# Patient Record
Sex: FEMALE | Race: WHITE | NOT HISPANIC OR LATINO | Employment: PART TIME | URBAN - METROPOLITAN AREA
[De-identification: names, ages, dates, MRNs, and addresses within clinical notes are randomized per-mention and may not be internally consistent; named-entity substitution may affect disease eponyms.]

---

## 2017-12-03 ENCOUNTER — OFFICE VISIT (OUTPATIENT)
Dept: URGENT CARE | Facility: URGENT CARE | Age: 22
End: 2017-12-03
Payer: COMMERCIAL

## 2017-12-03 VITALS
HEART RATE: 76 BPM | DIASTOLIC BLOOD PRESSURE: 76 MMHG | TEMPERATURE: 98.4 F | HEIGHT: 61 IN | SYSTOLIC BLOOD PRESSURE: 108 MMHG | OXYGEN SATURATION: 98 % | WEIGHT: 110 LBS | RESPIRATION RATE: 14 BRPM | BODY MASS INDEX: 20.77 KG/M2

## 2017-12-03 DIAGNOSIS — J06.9 VIRAL URI: ICD-10-CM

## 2017-12-03 DIAGNOSIS — R07.0 THROAT PAIN: Primary | ICD-10-CM

## 2017-12-03 LAB
DEPRECATED S PYO AG THROAT QL EIA: NORMAL
FLUAV+FLUBV AG SPEC QL: NEGATIVE
FLUAV+FLUBV AG SPEC QL: NEGATIVE
SPECIMEN SOURCE: NORMAL
SPECIMEN SOURCE: NORMAL

## 2017-12-03 PROCEDURE — 87804 INFLUENZA ASSAY W/OPTIC: CPT | Performed by: FAMILY MEDICINE

## 2017-12-03 PROCEDURE — 87081 CULTURE SCREEN ONLY: CPT | Performed by: FAMILY MEDICINE

## 2017-12-03 PROCEDURE — 99213 OFFICE O/P EST LOW 20 MIN: CPT | Performed by: FAMILY MEDICINE

## 2017-12-03 PROCEDURE — 87880 STREP A ASSAY W/OPTIC: CPT | Performed by: FAMILY MEDICINE

## 2017-12-03 NOTE — PROGRESS NOTES
"SUBJECTIVE:  Kinza Purdy, a 22 year old female scheduled an appointment to discuss the following issues:     Throat pain  Viral URI    Medical, social, surgical, and family histories reviewed.    Urgent Care      Fever  fever, legs ache, feeling sick x 2 days. nausea and post nasal drip. lightheaded. sore throat.       Flu sxs since yesterday.  Myalgia. Facing exams at school.  Sore throat with mild odynophagia.    ROS:  See HPI.  No vomiting.  Low grade fever/chills.  No chest pain/SOB.  No BM/urine problems.  No syncope.      OBJECTIVE:  /76  Pulse 76  Temp 98.4  F (36.9  C) (Oral)  Resp 14  Ht 5' 1\" (1.549 m)  Wt 110 lb (49.9 kg)  LMP 11/26/2017  SpO2 98%  BMI 20.78 kg/m2  EXAM:  GENERAL APPEARANCE: alert and no distress; no cyanosis or accessory muscle use; moist mucus membrane  EYES: Eyes grossly normal to inspection, PERRL and conjunctivae and sclerae normal  HENT: ear canals and TM's normal and nose and mouth without ulcers or lesions; mildly erythematous throat with no exudate  NECK: no adenopathy, no asymmetry, masses, or scars and thyroid normal to palpation  RESP: lungs clear to auscultation - no rales, rhonchi or wheezes  CV: regular rates and rhythm, normal S1 S2, no S3 or S4 and no murmur, click or rub  LYMPHATICS: normal ant/post cervical and supraclavicular nodes  ABDOMEN: soft, nontender, without hepatosplenomegaly or masses and bowel sounds normal  MS: extremities normal- no gross deformities noted  SKIN: no suspicious lesions or rashes  NEURO: Normal strength and tone, mentation intact and speech normal    ASSESSMENT/PLAN:  (R07.0) Throat pain  (primary encounter diagnosis)  Comment: strep and Influenza negative.  Plan: Strep, Rapid Screen, Beta strep group A         culture, Influenza A/B antigen    (J06.9,  B97.89) Viral URI  Plan: Symptomatic Rx only. Fluid, rest.  Pt to f/up PCP if no improvement or worsening.  Warning signs and symptoms explained.        "

## 2017-12-03 NOTE — PATIENT INSTRUCTIONS

## 2017-12-03 NOTE — MR AVS SNAPSHOT
After Visit Summary   12/3/2017    Kinza Purdy    MRN: [id number]           Patient Information     Date Of Birth          1995        Visit Information        Provider Department      12/3/2017 11:30 AM Ben Hannon MD [facility]        Today's Diagnoses     Throat pain    -  1    Viral URI          Care Instructions      Viral Upper Respiratory Illness (Adult)  You have a viral upper respiratory illness (URI), which is another term for the common cold. This illness is contagious during the first few days. It is spread through the air by coughing and sneezing. It may also be spread by direct contact (touching the sick person and then touching your own eyes, nose, or mouth). Frequent handwashing will decrease risk of spread. Most viral illnesses go away within 7 to 10 days with rest and simple home remedies. Sometimes the illness may last for several weeks. Antibiotics will not kill a virus, and they are generally not prescribed for this condition.    Home care    If symptoms are severe, rest at home for the first 2 to 3 days. When you resume activity, don't let yourself get too tired.    Avoid being exposed to cigarette smoke (yours or others ).    You may use acetaminophen or ibuprofen to control pain and fever, unless another medicine was prescribed. (Note: If you have chronic liver or kidney disease, have ever had a stomach ulcer or gastrointestinal bleeding, or are taking blood-thinning medicines, talk with your healthcare provider before using these medicines.) Aspirin should never be given to anyone under 18 years of age who is ill with a viral infection or fever. It may cause severe liver or brain damage.    Your appetite may be poor, so a light diet is fine. Avoid dehydration by drinking 6 to 8 glasses of fluids per day (water, soft drinks, juices, tea, or soup). Extra fluids will help loosen secretions in the nose and lungs.    Over-the-counter  cold medicines will not shorten the length of time you re sick, but they may be helpful for the following symptoms: cough, sore throat, and nasal and sinus congestion. (Note: Do not use decongestants if you have high blood pressure.)  Follow-up care  Follow up with your healthcare provider, or as advised.  When to seek medical advice  Call your healthcare provider right away if any of these occur:    Cough with lots of colored sputum (mucus)    Severe headache; face, neck, or ear pain    Difficulty swallowing due to throat pain    Fever of 100.4 F (38 C)  Call 911, or get immediate medical care  Call emergency services right away if any of these occur:    Chest pain, shortness of breath, wheezing, or difficulty breathing    Coughing up blood    Inability to swallow due to throat pain  Date Last Reviewed: 9/13/2015 2000-2017 [employer]. [address] [place] [address]. All rights reserved. This information is not intended as a substitute for professional medical care. Always follow your healthcare professional's instructions.                Follow-ups after your visit        Who to contact     If you have questions or need follow up information about today's clinic visit or your schedule please contact [facility] directly at [phone].  Normal or non-critical lab and imaging results will be communicated to you by [employer], letter or phone within 4 business days after the clinic has received the results. If you do not hear from us within 7 days, please contact the clinic through [employer] or phone. If you have a critical or abnormal lab result, we will notify you by phone as soon as possible.  Submit refill requests through [employer] or call your pharmacy and they will forward the refill request to us. Please allow 3 business days for your refill to be completed.          Additional Information About Your Visit        [employer]oor Information     [employer] lets you send  "messages to your doctor, view your test results, renew your prescriptions, schedule appointments and more. To sign up, go to [email or web address] . Click on \"Log in\" on the left side of the screen, which will take you to the Welcome page. Then click on \"Sign up Now\" on the right side of the page.     You will be asked to enter the access code listed below, as well as some personal information. Please follow the directions to create your username and password.     Your access code is: [email or web address]  Expires: 3/3/2018  1:07 PM     Your access code will  in 90 days. If you need help or a new code, please call your [place] clinic or [phone].        Care EveryWhere ID     This is your Care EveryWhere ID. This could be used by other organizations to access your [place] medical records  [id number]        Your Vitals Were     Pulse Temperature Respirations Height Last Period Pulse Oximetry    76 98.4  F (36.9  C) (Oral) 14 5' 1\" (1.549 m) 2017 98%    BMI (Body Mass Index)                   20.78 kg/m2            Blood Pressure from Last 3 Encounters:   17 108/76    Weight from Last 3 Encounters:   17 110 lb (49.9 kg)              We Performed the Following     Beta strep group A culture     Influenza A/B antigen     Strep, Rapid Screen        Primary Care Provider Fax #    Physician No Ref-Primary [phone]       No address on file        Equal Access to Services     OLIVER GROSSMAN : Hadii dalila chaseo Somaydaali, waaxda luqadaha, qaybta kaalmada adeegyada, tammie villegas . So [facility] [phone].    ATENCIÓN: Si habla español, tiene a mcfarland disposición servicios gratuitos de asistencia lingüística. Llame al [phone].    We comply with applicable federal civil rights laws and [place] laws. We do not discriminate on the basis of race, color, national origin, age, disability, sex, sexual orientation, or gender identity.            Thank you!     Thank you for " choosing [facility]  for your care. Our goal is always to provide you with excellent care. Hearing back from our patients is one way we can continue to improve our services. Please take a few minutes to complete the written survey that you may receive in the mail after your visit with us. Thank you!             Your Updated Medication List - Protect others around you: Learn how to safely use, store and throw away your medicines at www.disposemymeds.org.      Notice  As of 12/3/2017  1:07 PM    You have not been prescribed any medications.

## 2017-12-04 LAB
BACTERIA SPEC CULT: NORMAL
SPECIMEN SOURCE: NORMAL

## 2020-05-01 ENCOUNTER — TELEPHONE (OUTPATIENT)
Dept: OTHER | Facility: OUTPATIENT CENTER | Age: 25
End: 2020-05-01

## 2020-05-01 NOTE — TELEPHONE ENCOUNTER
Telephone Intake--[facility] Adult  Date: 2020  Client Name:  Kinza  Preferred Name: Christine   MRN: .mrn  : .bday        Age:  .age  Caller Name:  Relationship to Client:      Presenting Problem / Reason for Assessment   (Clinical History &Symptoms):     FTM  Goals: Decrease dysphoria, masculinity, deeper voice, facial / body hair  Family Support: Positive      Clarify their goals/expected services from [facility] medical care--what are they looking for?    Clarify with patient (as necessary) that we are not a primary care clinic and that we do not have a surgeon. We strongly recommend that patients have a primary care doctor for their overall health needs, and we can refer to primary care clinics. We can assist with surgery referrals.  Yes      Length of time experiencing symptoms: Puberty (12-13)      Seen Other Providers for Gender (if so, where):    M.D/other.(hormones) : No  --If yes, request records from the provider at the 1st session.    Therapist:   --if yes, request a referral or summary letter from the therapist at the 1st session..    Psychiatrist: NO  --if yes,, request records from the provider at the 1st session..      Other Medical/Mental Health Diagnoses: Gender Dysphoria,         If needed, clarify if patient calling from group home or other supervised living arrangement    Note if patient has no mental health or cognitive diagnosis, but phone behavior suggests impairment      Medications:  Fluocox, strattera        Primary Care Provider:                     --If multiple medical conditions, request recent records from primary doctor at the 1st session..      Referral Source:        Follow Up:        Please Verify Registration    If patient has [employer] or [employer], send to .     Prep Welcome Packet and have patient come half hour beforehand to fill out forms in packet (health history form, transgender history, Safety Screening, PHQ9, and MARIE-7.

## 2020-11-16 ENCOUNTER — OFFICE VISIT (OUTPATIENT)
Dept: FAMILY MEDICINE | Facility: CLINIC | Age: 25
End: 2020-11-16
Payer: COMMERCIAL

## 2020-11-16 VITALS
WEIGHT: 122 LBS | TEMPERATURE: 98.7 F | HEART RATE: 85 BPM | HEIGHT: 62 IN | SYSTOLIC BLOOD PRESSURE: 141 MMHG | OXYGEN SATURATION: 99 % | BODY MASS INDEX: 22.45 KG/M2 | DIASTOLIC BLOOD PRESSURE: 87 MMHG

## 2020-11-16 DIAGNOSIS — Z78.9 FEMALE-TO-MALE TRANSGENDER PERSON: Primary | ICD-10-CM

## 2020-11-16 PROBLEM — F32.5 DEPRESSION, MAJOR, IN REMISSION (H): Status: ACTIVE | Noted: 2017-04-14

## 2020-11-16 LAB
BASOPHILS # BLD AUTO: 0 10E9/L (ref 0–0.2)
BASOPHILS NFR BLD AUTO: 0.3 %
DIFFERENTIAL METHOD BLD: NORMAL
EOSINOPHIL # BLD AUTO: 0.2 10E9/L (ref 0–0.7)
EOSINOPHIL NFR BLD AUTO: 1.7 %
ERYTHROCYTE [DISTWIDTH] IN BLOOD BY AUTOMATED COUNT: 12.3 % (ref 10–15)
HBA1C MFR BLD: 5.3 % (ref 0–5.6)
HCT VFR BLD AUTO: 42.5 % (ref 35–47)
HGB BLD-MCNC: 14.4 G/DL (ref 11.7–15.7)
LYMPHOCYTES # BLD AUTO: 2.2 10E9/L (ref 0.8–5.3)
LYMPHOCYTES NFR BLD AUTO: 21.5 %
MCH RBC QN AUTO: 29.8 PG (ref 26.5–33)
MCHC RBC AUTO-ENTMCNC: 33.9 G/DL (ref 31.5–36.5)
MCV RBC AUTO: 88 FL (ref 78–100)
MONOCYTES # BLD AUTO: 0.8 10E9/L (ref 0–1.3)
MONOCYTES NFR BLD AUTO: 7.8 %
NEUTROPHILS # BLD AUTO: 7 10E9/L (ref 1.6–8.3)
NEUTROPHILS NFR BLD AUTO: 68.7 %
PLATELET # BLD AUTO: 436 10E9/L (ref 150–450)
RBC # BLD AUTO: 4.84 10E12/L (ref 3.8–5.2)
WBC # BLD AUTO: 10.2 10E9/L (ref 4–11)

## 2020-11-16 PROCEDURE — 80061 LIPID PANEL: CPT | Performed by: FAMILY MEDICINE

## 2020-11-16 PROCEDURE — 80053 COMPREHEN METABOLIC PANEL: CPT | Performed by: FAMILY MEDICINE

## 2020-11-16 PROCEDURE — 83036 HEMOGLOBIN GLYCOSYLATED A1C: CPT | Performed by: FAMILY MEDICINE

## 2020-11-16 PROCEDURE — 85025 COMPLETE CBC W/AUTO DIFF WBC: CPT | Performed by: FAMILY MEDICINE

## 2020-11-16 PROCEDURE — 99214 OFFICE O/P EST MOD 30 MIN: CPT | Performed by: FAMILY MEDICINE

## 2020-11-16 PROCEDURE — 36415 COLL VENOUS BLD VENIPUNCTURE: CPT | Performed by: FAMILY MEDICINE

## 2020-11-16 RX ORDER — ATOMOXETINE 80 MG/1
80 CAPSULE ORAL
COMMUNITY
Start: 2020-07-23

## 2020-11-16 RX ORDER — FLUOXETINE 40 MG/1
40 CAPSULE ORAL
COMMUNITY
Start: 2020-07-23

## 2020-11-16 RX ORDER — TESTOSTERONE GEL, 1% 10 MG/G
50 GEL TRANSDERMAL DAILY
Qty: 30 PACKET | Refills: 0 | Status: SHIPPED | OUTPATIENT
Start: 2020-11-16 | End: 2020-12-21

## 2020-11-16 ASSESSMENT — MIFFLIN-ST. JEOR: SCORE: 1256.64

## 2020-11-16 NOTE — PATIENT INSTRUCTIONS
To Do:    ---A Pap/Cervical Exam    ---Have your therapist send me a brief summary of your care there    We will review labs        Discussed that the following changes are likely and permanent even if  stop taking testosterone:   -?bigger clitoris -- typically about half an inch to a little more than an inch   -?deeper voice   -?gradual growth of moustache and beard   -?hair loss at the temples and crown of the head -- possibility of being completely bald   -?more, thicker, and coarser hairs on abdomen, arms, back, chest, and legs     Discussed that the following changes are usually not permanent -- they are likely to go away if  stop taking testosterone:   -?acne (many permanently scar)   -?menstrual periods typically stop one to six months after starting   -?more abdominal fat - redistributed to a male shape: decreased on buttocks, hips, and thighs; increased in abdomen - changing from ?pear shape? to ?apple shape?   -?more muscle mass and strength   -?more sex drive   -?vaginal dryness     Discussed that the effects of testosterone on fertility are unknown.  Patient can still get pregnant even after testosterone stops menstrual periods.    Discussed that some aspects  will not be changed:   -?Losing some fat may make breasts appear slightly smaller, but they will not shrink very much.   -?Although voice will deepen, other aspects  may not sound manlier.     Discussed risks, including  that the medical effects and the safety of testosterone are not completely known. There may be long-term risks that are not yet known.    testosterone can cause changes that increase my risk of heart disease. I know these changes include having   -?less good cholesterol (HDL) that may protect against heart disease and more bad cholesterol (LDL) that may increase the risk of heart disease   -?higher blood pressure   -?more deposits of fat around my internal organs    testosterone can damage the liver and possibly lead to liver  disease. I know I should be checked for possible liver damage for as long as I take testosterone.    testosterone can increase my red blood cells and hemoglobin. that a higher increase can cause problems that can be life-threatening. These problems include stroke and heart attack. That s why I know I need to have periodic blood checks for as long as I take testosterone.    increase my risk for diabetes.  the body can turn testosterone into estrogen.  no one knows if that could increase the risk of cancers of the breast, the ovaries, or the uterus.    testosterone can thin the tissue of my cervix and the walls of my vagina.  testosterone can cause emotional changes. testosterone causes changes that other people will notice       Masculinizing           Resource for referrals, Ie surgery, skin, hair, voice, etc    PATTIE Castro, SINDHU  Pronouns: he/him/his  Transgender  Care Coordinator   [facility]  [address] [place] [address] [place] [address]  [email or web address]  Appointments: [phone] Office: [phone] Fax: [phone]

## 2020-11-16 NOTE — PROGRESS NOTES
"Subjective     Kinza Rika Purdy is a 25 year old female who presents to clinic today for the following health issues:    HPI         Hormone consult           HPI     Christine is a 25 year old individual that uses pronouns He/Him/His/Himself that presents today for establishment of care for   masculinizing hormone therapy.     Started thinking about gender not matching body in middle school  Has been a bit to anxious about it to really talk much to medical providers, did talk with his PCP  Has a therapist  Family/sister is supportive    Recent visit:On atomoxetine 80 mg daily. The medication is very helpful for keeping her focused and accomplishing tasks. If she doesn't take it, she doesn't get anything done. No significant issues with sleep. Stopped drinking lemonade at work and her intermittent chest pains went away. Appetite is \"fine\".     Is also taking fluoxetine 40 mg daily. Mood is good. Is working on learning to drive. PHQ9 score of 4 (down from 10). The patient has no thoughts of hurting self or others. GAD7 3 (down from 5).     Has been working ([employer] in [place]), playing [employer] online.     Is considering starting testosterone. Since high school she has felt that she identifies as a male.        Any special concerns today?  anxious    On hormones?  No    Gender affirmation is being sought in these other ways: trans/nonbinary community involvement, pronoun support at work/school/home    Apparently he had a phone consulation with planned parent zion but did not get an Rx      :    S.O.G.I.  Patient's Preferred First Name:  Christine  Patient's pronouns:  he/him/his       Patient's sexual orientation:  Don't know  Patient's gender identity:    Patient's sex assigned at birth:  Female    Steps patient has taken to transition, if any:                      Patient's future plans to transition, if any:       Organs the patient currently has:     breasts  cervix  ovaries  uterus  vagina     "     Organs present at birth or expected at birth to develop:     breasts  cervix  ovaries  uterus  vagina         Organs hormonally enhanced or developed:          Organs surgically enhanced or constructed:            ---    No past surgical history on file.    Patient Active Problem List   Diagnosis     ADHD (attention deficit hyperactivity disorder), combined type     Depression, major, in remission (H)     Female-to-male transgender person     Autism spectrum disorder       Current Outpatient Medications   Medication Sig Dispense Refill     atomoxetine (STRATTERA) 80 MG capsule Take 80 mg by mouth       FLUoxetine (PROZAC) 40 MG capsule Take 40 mg by mouth       testosterone (ANDROGEL/TESTIM) 50 MG/5GM (1%) topical gel Place 1 packet (50 mg of testosterone) onto the skin daily Apply to the clean, dry intact skin of the shoulders, upper arms or abdomen. 30 packet 0       History   Smoking Status     Never Smoker   Smokeless Tobacco     Never Used     Family History   Problem Relation Age of Onset     Depression Mother      Hypertension Father      Diabetes Maternal Grandmother         No Known Allergies    Problem, Medication and Allergy Lists were reviewed and are current..         Review of Systems:        General    Fat redistribution: no    Weight change: no HEENT    Voice change: no                Skin    Acne or oily skin: no       Genitourinary ()    Abnormal vaginal bleeding: no  Musculoskeletal    Leg pain or swelling: no     Psychiatric (Psych)    Depression: no    Anxiety/Panic: YES    Mood:  nervous        10 point ROS of systems including Constitutional, Eyes, Respiratory, Cardiovascular, Gastroenterology, Genitourinary, Integumentary, Muscularskeletal, Psychiatric were all negative except for pertinent positives noted in my HPI.              Physical Exam:     Vitals:    11/16/20 1243   BP: (!) 141/87   BP Location: Left arm   Cuff Size: Adult Regular   Pulse: 85   Temp: 98.7  F (37.1  C)  "  TempSrc: Tympanic   SpO2: 99%   Weight: 55.3 kg (122 lb)   Height: 1.575 m (5' 2\")     BMI= Body mass index is 22.31 kg/m .   Wt Readings from Last 10 Encounters:   11/16/20 55.3 kg (122 lb)   12/03/17 49.9 kg (110 lb)     Appearance: Female appearance and dress    GENERAL:: healthy, alert and no distress  General Appearance: Pleasant, alert, in no acute respiratory distress.  Head Exam: Normal.   Eye Exam: Normal external eye,  lids. EVARISTO.  Ear Exam: Normal   external ears.  Chest/Respiratory Exam: Normal, comfortable, easy respirations   Cardiovascular Exam: normal color, circulation   Musculoskeletal Exam: full ROM of upper and lower extremities.  Skin: no rashes  Gyn: normal secondary sexual characteristic for birth sex  Neurologic Exam: Nonfocal, no tremor. Normal gait.    GROOMING: Adequately groomed.  ATTIRE: Appropriate.  AGE: Appears stated.  BEHAVIOR TOWARDS EXAMINER: Cooperative.  MOTOR ACTIVITY: figity  EYE CONTACT: furtive  Mood:  anxious  Affect:  mood congruent  SPEECH/LANGUAGE: Unremarkable.  ATTENTION: Unremarkable.  CONCENTRATION: Unremarkable.  THOUGHT PROCESS: Unremarkable  THOUGHT CONTENT: Unremarkable for hallucinations and delusions.  ORIENTATION: Times 3.  MEMORY: No evidence of impairment.  JUDGMENT: No evidence of impairment.  ESTIMATED INTELLIGENCE: above Average.  DEMONSTRATED INSIGHT: excellent  FUND OF KNOWLEDGE: excellent             Labs:      Results from the last 24 hours  Results for orders placed or performed in visit on 11/16/20 (from the past 24 hour(s))   CBC with platelets and differential   Result Value Ref Range    WBC 10.2 4.0 - 11.0 10e9/L    RBC Count 4.84 3.8 - 5.2 10e12/L    Hemoglobin 14.4 11.7 - 15.7 g/dL    Hematocrit 42.5 35.0 - 47.0 %    MCV 88 78 - 100 fl    MCH 29.8 26.5 - 33.0 pg    MCHC 33.9 31.5 - 36.5 g/dL    RDW 12.3 10.0 - 15.0 %    Platelet Count 436 150 - 450 10e9/L    % Neutrophils 68.7 %    % Lymphocytes 21.5 %    % Monocytes 7.8 %    % Eosinophils " 1.7 %    % Basophils 0.3 %    Absolute Neutrophil 7.0 1.6 - 8.3 10e9/L    Absolute Lymphocytes 2.2 0.8 - 5.3 10e9/L    Absolute Monocytes 0.8 0.0 - 1.3 10e9/L    Absolute Eosinophils 0.2 0.0 - 0.7 10e9/L    Absolute Basophils 0.0 0.0 - 0.2 10e9/L    Diff Method Automated Method    Hemoglobin A1c   Result Value Ref Range    Hemoglobin A1C 5.3 0 - 5.6 %       Assessment and Plan     1. Female-to-male transgender person  - CBC with platelets and differential  - Comprehensive metabolic panel  - Lipid Profile (Chol, Trig, HDL, LDL calc)  - Hemoglobin A1c  - testosterone (ANDROGEL/TESTIM) 50 MG/5GM (1%) topical gel; Place 1 packet (50 mg of testosterone) onto the skin daily Apply to the clean, dry intact skin of the shoulders, upper arms or abdomen.  Dispense: 30 packet; Refill: 0        Contraception:   abstinence.     Counselled patient about controlled substances: Yes.     Follow up:  I will call to discuss  Results by mychart  Questions were elicited and answered.     Discussed thoroughly risks/benefits/alternatives of this treatment. Questions elicited and answered. Pt is fully prepared to start hormones and is able to reason through risks and mgmt.      gender dysphoria diagnosis, its viewability on MyChart, prescriptions, insurance forms and potential release to parents if on parents insurance.      lack of insurance coverage for many of these meds, however we can try to obtain through a PA and possibly an appeal.       fertility impact (possibly permanent and irreversible) yet also cannot serve as contraception and sexual activity that can result in pregnancy requires contraception. Discussed this could be sperm-having females (transwomen) in his future.}     CV, circulatory, skin, abdominal, social and sexual side effects, potential treatments and resources.      Hormone start plan:   masculinizing hormone therapy with testosterone   Consent signed - see scanned.    Administration method:  transdermal, topical,  injectable  Next labs and exam:   . Next dose increase likely in 1 month if labs and exam are normal.    ---------------------------------------------------------------------------------------------------------  Today s visit included assessment of interventions to alleviate symptoms related to gender dysphoria or gender nonconformity, including     psychological support    medical treatment    options for social support or changes in gender expression.   Hormone assessment concludes that the patient:   Yes Is able to provide informed consent   Yes Likely to take hormones in a responsible manner  Yes Discussed physical effects, benefits, and risk assessment & modification  Yes Discussed the clinical and biochemical monitoring of hormonal changes and the potential impact on reproductive health & fertility    This assessment is based on the 2011 published Standards of Care for the Health of Transsexual, Transgender, and Gender-Nonconforming People, Version 7, by the [employer] Professional [employer] of Transgender Health.  Today s visit assessed this individuals:    Suicide risk. Transgender patients are at higher risk of suicide.    gender expression    gender identity and how well consolidated in that identity     presence or absence of gender dysphoria versus gender non-conformity    assessment and diagnosis of coexisting mental health concerns    Gianni Mistry MD

## 2020-11-16 NOTE — NURSING NOTE
"Chief Complaint   Patient presents with     Consult     hormone care     initial BP (!) 141/87 (BP Location: Left arm, Cuff Size: Adult Regular)   Pulse 85   Temp 98.7  F (37.1  C) (Tympanic)   Ht 1.575 m (5' 2\")   Wt 55.3 kg (122 lb)   SpO2 99%   BMI 22.31 kg/m   Estimated body mass index is 22.31 kg/m  as calculated from the following:    Height as of this encounter: 1.575 m (5' 2\").    Weight as of this encounter: 55.3 kg (122 lb).  BP completed using cuff size: regular.  L  arm      Health Maintenance that is potentially due pending provider review:  NONE    n/a    Kenneth Leonard ma  "

## 2020-11-17 LAB
ALBUMIN SERPL-MCNC: 4.3 G/DL (ref 3.4–5)
ALP SERPL-CCNC: 83 U/L (ref 40–150)
ALT SERPL W P-5'-P-CCNC: 21 U/L (ref 0–50)
ANION GAP SERPL CALCULATED.3IONS-SCNC: 8 MMOL/L (ref 3–14)
AST SERPL W P-5'-P-CCNC: 17 U/L (ref 0–45)
BILIRUB SERPL-MCNC: 0.3 MG/DL (ref 0.2–1.3)
BUN SERPL-MCNC: 10 MG/DL (ref 7–30)
CALCIUM SERPL-MCNC: 9 MG/DL (ref 8.5–10.1)
CHLORIDE SERPL-SCNC: 105 MMOL/L (ref 94–109)
CHOLEST SERPL-MCNC: 136 MG/DL
CO2 SERPL-SCNC: 23 MMOL/L (ref 20–32)
CREAT SERPL-MCNC: 0.51 MG/DL (ref 0.52–1.04)
GFR SERPL CREATININE-BSD FRML MDRD: >90 ML/MIN/{1.73_M2}
GLUCOSE SERPL-MCNC: 85 MG/DL (ref 70–99)
HDLC SERPL-MCNC: 50 MG/DL
LDLC SERPL CALC-MCNC: 73 MG/DL
NONHDLC SERPL-MCNC: 86 MG/DL
POTASSIUM SERPL-SCNC: 3.5 MMOL/L (ref 3.4–5.3)
PROT SERPL-MCNC: 8 G/DL (ref 6.8–8.8)
SODIUM SERPL-SCNC: 136 MMOL/L (ref 133–144)
TRIGL SERPL-MCNC: 64 MG/DL

## 2020-11-19 ENCOUNTER — TELEPHONE (OUTPATIENT)
Dept: FAMILY MEDICINE | Facility: CLINIC | Age: 25
End: 2020-11-19

## 2020-11-19 NOTE — TELEPHONE ENCOUNTER
PA for testosterone (ANDROGEL/TESTIM) 50 MG/5GM (1%) topical gel initiated through [employer].  ID# [id number]  [employer] ph# [phone]

## 2020-11-24 ENCOUNTER — MYC MEDICAL ADVICE (OUTPATIENT)
Dept: FAMILY MEDICINE | Facility: CLINIC | Age: 25
End: 2020-11-24

## 2020-12-09 NOTE — TELEPHONE ENCOUNTER
Summary:    Patient is due/failing the following:   PAP    Type of outreach:  Sent [employer] message.  Action needed: Patient needs office visit for pap.    If need for provider review:    Please indicate OV, lab, MTM, or nurse appt if needed.  Indicate fasting or not fasting.                                                                                                                                          Dede FULTON

## 2020-12-17 ENCOUNTER — MYC MEDICAL ADVICE (OUTPATIENT)
Dept: FAMILY MEDICINE | Facility: CLINIC | Age: 25
End: 2020-12-17

## 2020-12-17 DIAGNOSIS — Z78.9 FEMALE-TO-MALE TRANSGENDER PERSON: ICD-10-CM

## 2020-12-18 NOTE — TELEPHONE ENCOUNTER
AFIA CERVANTES also approved.  Please advise on [employer].  Thanks,  Sunshine Regalado RN      
JF,  Please see below [employer] message and advise.  PA initiated back in Nov for Androgel - routing to Mabel as well to see if PA approved/denied/etc  Thanks,  Julia CAROLINA RN    
PA approved as of 11/22/2020.  
Oriented - self; Oriented - place; Oriented - time

## 2020-12-28 ENCOUNTER — TELEPHONE (OUTPATIENT)
Dept: FAMILY MEDICINE | Facility: CLINIC | Age: 25
End: 2020-12-28

## 2020-12-28 DIAGNOSIS — Z78.9 FEMALE-TO-MALE TRANSGENDER PERSON: ICD-10-CM

## 2020-12-28 NOTE — TELEPHONE ENCOUNTER
JF,  Please see below and reorder alt below if appropriate.  Pharm pended.  Thanks,  Sunshine Regalado RN

## 2020-12-29 RX ORDER — TESTOSTERONE GEL, 1% 10 MG/G
50 GEL TRANSDERMAL DAILY
Qty: 30 PACKET | Refills: 3 | Status: SHIPPED | OUTPATIENT
Start: 2020-12-29 | End: 2021-01-28

## 2020-12-30 NOTE — TELEPHONE ENCOUNTER
PA approved for Testim.  Genaro unable to get a paid claim.  LM on VM for pt to get NDC# for covered product as he is an employee of [employer] [facility] [employer] and their reps are unable to speak with us.  [employer]um[employer] [phone]  ID# [id number]  Genaro ph# [phone].

## 2020-12-31 ENCOUNTER — TELEPHONE (OUTPATIENT)
Dept: FAMILY MEDICINE | Facility: CLINIC | Age: 25
End: 2020-12-31

## 2020-12-31 NOTE — TELEPHONE ENCOUNTER
Patient Quality Outreach      Summary:      Patient is due/failing the following:   Cervical Cancer Screening - PAP Needed    Type of outreach:    Phone, spoke to patient/parent. Christine has a pap scheduled next week outside of [place].    Questions for provider review:    None                                                                                                                                     Mireya Burleson     Chart routed to Care Team.

## 2020-12-31 NOTE — LETTER
[facility]  [address]  [place] [facility] [address] [phone]       February 23, 2021    Kinza Meléndez Héctor Purdy  [address]  [place] [address]    Dear Christine,    We care about your health and have reviewed your health plan and are making recommendations based on this review, to optimize your health.    You are in particular need of attention regarding:  -Wellness (Physical) Visit     We are recommending that you:    We do offer early morning visits where there is minimal patient traffic here.  -schedule a PAP SMEAR EXAM which is due.  Please disregard this reminder if you have had this exam elsewhere within the last year.  It would be helpful for us to have a copy of your recent pap smear report in our file so that we can best coordinate your care.    If you are under/uninsured, we recommend you contact the [facility]. They offer pap smears at no charge or on a sliding fee charge. You can schedule with them at [phone]. Please have them send us the results.      In addition, here is a list of due or overdue Health Maintenance reminders.    Health Maintenance Due   Topic Date Due     Preventive Care Visit  1995     Chlamydia Screening  1996     Discuss Advance Care Planning  1996     Depression Action Plan  1996     Depression Assessment  1996     HIV Screening  08/01/2011     Hepatitis C Screening  08/01/2014     PAP Smear  08/01/2017       To address the above recommendations, we encourage you to contact us at [phone], via Smark[employer] or by contacting Central Scheduling toll free at [phone] 24 hours a day. They will assist you with finding the most convenient time and location.    Thank you for trusting [facility] and we appreciate the opportunity to serve you.  We look forward to supporting your healthcare needs in the future.    Healthy Regards,    Your [facility] Team

## 2021-01-15 ENCOUNTER — HEALTH MAINTENANCE LETTER (OUTPATIENT)
Age: 26
End: 2021-01-15

## 2021-01-28 ENCOUNTER — VIRTUAL VISIT (OUTPATIENT)
Dept: FAMILY MEDICINE | Facility: CLINIC | Age: 26
End: 2021-01-28
Payer: COMMERCIAL

## 2021-01-28 DIAGNOSIS — Z78.9 FEMALE-TO-MALE TRANSGENDER PERSON: Primary | ICD-10-CM

## 2021-01-28 DIAGNOSIS — N94.19 FUNCTIONAL DYSPAREUNIA: ICD-10-CM

## 2021-01-28 PROCEDURE — 99214 OFFICE O/P EST MOD 30 MIN: CPT | Mod: 95 | Performed by: FAMILY MEDICINE

## 2021-01-28 RX ORDER — TESTOSTERONE GEL, 1% 10 MG/G
50 GEL TRANSDERMAL DAILY
Qty: 30 PACKET | Refills: 3 | Status: SHIPPED | OUTPATIENT
Start: 2021-01-28 | End: 2021-04-29

## 2021-01-28 SDOH — HEALTH STABILITY: MENTAL HEALTH: HOW OFTEN DO YOU HAVE A DRINK CONTAINING ALCOHOL?: NEVER

## 2021-01-28 NOTE — PROGRESS NOTES
Christine is a 25 year old who is being evaluated via a billable video visit.      How would you like to obtain your AVS? MyChart  If the video visit is dropped, the invitation should be resent by: Text to cell phone: [phone]  Will anyone else be joining your video visit? No    Video Start Time: 12:13 PM  Assessment & Plan     Female-to-male transgender person    ---------------------------------------------------------------------------------------------------------  Today s visit included assessment of interventions to alleviate symptoms related to gender dysphoria or gender nonconformity, including     psychological support    medical treatment    options for social support or changes in gender expression.   Hormone assessment concludes that the patient:   Yes Is able to provide informed consent   Yes Likely to take hormones in a responsible manner  Yes Discussed physical effects, benefits, and risk assessment & modification  Yes Discussed the clinical and biochemical monitoring of hormonal changes and the potential impact on reproductive health & fertility    This assessment is based on the 2011 published Standards of Care for the Health of Transsexual, Transgender, and Gender-Nonconforming People, Version 7, by the World Professional Association of Transgender Health.  Today s visit assessed this individuals:    Suicide risk. Transgender patients are at higher risk of suicide.    gender expression    gender identity and how well consolidated in that identity     presence or absence of gender dysphoria versus gender non-conformity    assessment and diagnosis of coexisting mental health concerns    Unclear why there are these problems with pharmacy    We will try sendning to our pharmacy, discussed with clinical pharmacist    - testosterone (ANDROGEL/TESTIM) 50 MG/5GM (1%) topical gel; Place 1 packet (50 mg of testosterone) onto the skin daily Apply to the clean, dry intact skin of the shoulders, upper arms or  abdomen.       Functional dyspareunia  No Hx of intercourse, low risk for HPV  We will just hold off on pap screening for now    30 minutes spent on the date of the encounter doing chart review, review of outside records, review of test results, patient visit and documentation     Gianni Mistry MD  [facility]    Shyam King is a 25 year old who presents to clinic today for the following health issues    HPI     Patient states she's had a hard time getting medications  Having issues getting Testosterone gel - states she goes to pharmacy and they turn here away saying insurance won't cover it   We have report that testime was approved from [place], not sure what the problem is    We will try sending to a [place] pharmacy      Pap question -  Tried getting pap done with ativan at another doc, this was not successful    I think we will just hold off for now    Health Maintenance Addressed:  PHQ9 - didn't want to answer out loud - sent to [facility]  Pap - wants to discuss with PCP      Review of Systems   Constitutional, HEENT, cardiovascular, pulmonary, GI, , musculoskeletal, neuro, skin, endocrine and psych systems are negative, except as otherwise noted.      Objective         Vitals:  No vitals were obtained today due to virtual visit.    Physical Exam   GENERAL: Healthy, alert and no distress  EYES: Eyes grossly normal to inspection.  No discharge or erythema, or obvious scleral/conjunctival abnormalities.  RESP: No audible wheeze, cough, or visible cyanosis.  No visible retractions or increased work of breathing.    SKIN: Visible skin clear. No significant rash, abnormal pigmentation or lesions.  NEURO: Cranial nerves grossly intact.  Mentation and speech appropriate for age.  PSYCH: Mentation appears normal, affect normal/bright, judgement and insight intact, normal speech and appearance well-groomed.          Video-Visit Details    Type of service:  Video Visit    Video  End Time:12:26 PM    Originating Location (pt. Location): Home    Distant Location (provider location):  [facility]     Platform used for Video Visit: Bob

## 2021-02-23 NOTE — TELEPHONE ENCOUNTER
Patient Quality Outreach 2nd Attempt      Summary:    Type of outreach:    Sent letter.    Next Steps:  Reach out within 90 days via Letter.    Max number of attempts reached: No. Will try again in 90 days if patient still on fail list.    Questions for provider review:    None                                                                                                                    Odalis Crespo MA  Medical Assistant  [facility]

## 2021-04-28 NOTE — PROGRESS NOTES
"    Assessment & Plan     Female-to-male transgender person    Noticing changes at a normal pace    - CBC with platelets and differential  - Testosterone, total  - Comprehensive metabolic panel  - testosterone (ANDROGEL/TESTIM) 50 MG/5GM (1%) topical gel; Place 1 packet (50 mg of testosterone) onto the skin daily Apply to the clean, dry intact skin of the shoulders, upper arms or abdomen.    We are deferring cervical cancer screening.  Pt is low risk anyway, has not been sexually active      Return in about 3 months (around 7/29/2021) for Medication recheck.    Gianni Mistry MD  [facility]    Shyam King is a 25 year old who presents for the following health issues     HPI     Medication Followup of testosterone gel     Taking Medication as prescribed: yes    Side Effects:  None    Medication Helping Symptoms:  yes      3 months on T now  Noticing ssome skin and hair and \"embarrassing\" changes.  Previous provider mentions increased libido    Background:    Started thinking about gender not matching body in middle school  Had been a bit to anxious about it to really talk much to medical providers, did talk with his PCP  Has a therapist  Family/sister is supportive    Recent visit:On atomoxetine 80 mg daily. The medication is very helpful for keeping her focused and accomplishing tasks. If she doesn't take it, she doesn't get anything done. No significant issues with sleep. Stopped drinking lemonade at work and her intermittent chest pains went away. Appetite is \"fine\".     Is also taking fluoxetine 40 mg daily. Mood is good. Is working on learning to drive. PHQ9 score of 4 (down from 10). The patient has no thoughts of hurting self or others. GAD7 3 (down from 5).     Has been working ([employer] [place]), playing [employer] and Dragons online.      Since high school he has felt that he  identifies as a male.  Any special concerns today?  anxious    On hormones?  yes    Gender " "affirmation is being sought in these other ways: trans/nonbinary community involvement, pronoun support at work/school/home    Apparently he had a phone consulation with planned parent donovan but did not get an Rx      S.O.G.I.  Patient's Preferred First Name:  Christine  Patient's pronouns:  he/him/his       Patient's sexual orientation:  Don't know  Patient's gender identity:  Male  Patient's sex assigned at birth:  Female    Steps patient has taken to transition, if any:     Presentation aligned with gender identity                Patient's future plans to transition, if any:       Organs the patient currently has:     breasts  cervix  ovaries  uterus  vagina         Organs present at birth or expected at birth to develop:     breasts  cervix  ovaries  uterus  vagina         Organs hormonally enhanced or developed:          Organs surgically enhanced or constructed:                Review of Systems   Constitutional, HEENT, cardiovascular, pulmonary, GI, , musculoskeletal, neuro, skin, endocrine and psych systems are negative, except as otherwise noted.      Objective    BP (!) 137/90 (Patient Position: Sitting, Cuff Size: Adult Regular)   Pulse 92   Temp 97  F (36.1  C) (Tympanic)   Resp 20   Ht 1.575 m (5' 2\")   Wt 57.4 kg (126 lb 8 oz)   SpO2 100%   BMI 23.14 kg/m    Body mass index is 23.14 kg/m .  Physical Exam   GENERAL: healthy, alert and no distress  PSYCH: mentation appears normal and anxious                "

## 2021-04-29 ENCOUNTER — OFFICE VISIT (OUTPATIENT)
Dept: FAMILY MEDICINE | Facility: CLINIC | Age: 26
End: 2021-04-29
Payer: COMMERCIAL

## 2021-04-29 VITALS
TEMPERATURE: 97 F | HEIGHT: 62 IN | RESPIRATION RATE: 20 BRPM | DIASTOLIC BLOOD PRESSURE: 90 MMHG | SYSTOLIC BLOOD PRESSURE: 137 MMHG | HEART RATE: 92 BPM | BODY MASS INDEX: 23.28 KG/M2 | WEIGHT: 126.5 LBS | OXYGEN SATURATION: 100 %

## 2021-04-29 DIAGNOSIS — Z78.9 FEMALE-TO-MALE TRANSGENDER PERSON: ICD-10-CM

## 2021-04-29 LAB
ALBUMIN SERPL-MCNC: 3.9 G/DL (ref 3.4–5)
ALP SERPL-CCNC: 78 U/L (ref 40–150)
ALT SERPL W P-5'-P-CCNC: 20 U/L (ref 0–50)
ANION GAP SERPL CALCULATED.3IONS-SCNC: 11 MMOL/L (ref 3–14)
AST SERPL W P-5'-P-CCNC: 12 U/L (ref 0–45)
BASOPHILS # BLD AUTO: 0 10E9/L (ref 0–0.2)
BASOPHILS NFR BLD AUTO: 0.3 %
BILIRUB SERPL-MCNC: 0.3 MG/DL (ref 0.2–1.3)
BUN SERPL-MCNC: 10 MG/DL (ref 7–30)
CALCIUM SERPL-MCNC: 8.9 MG/DL (ref 8.5–10.1)
CHLORIDE SERPL-SCNC: 105 MMOL/L (ref 94–109)
CO2 SERPL-SCNC: 24 MMOL/L (ref 20–32)
CREAT SERPL-MCNC: 0.51 MG/DL (ref 0.52–1.04)
DIFFERENTIAL METHOD BLD: NORMAL
EOSINOPHIL # BLD AUTO: 0.3 10E9/L (ref 0–0.7)
EOSINOPHIL NFR BLD AUTO: 3.7 %
ERYTHROCYTE [DISTWIDTH] IN BLOOD BY AUTOMATED COUNT: 12.2 % (ref 10–15)
GFR SERPL CREATININE-BSD FRML MDRD: >90 ML/MIN/{1.73_M2}
GLUCOSE SERPL-MCNC: 67 MG/DL (ref 70–99)
HCT VFR BLD AUTO: 41.6 % (ref 35–47)
HGB BLD-MCNC: 13.2 G/DL (ref 11.7–15.7)
LYMPHOCYTES # BLD AUTO: 1.7 10E9/L (ref 0.8–5.3)
LYMPHOCYTES NFR BLD AUTO: 24.1 %
MCH RBC QN AUTO: 27.8 PG (ref 26.5–33)
MCHC RBC AUTO-ENTMCNC: 31.7 G/DL (ref 31.5–36.5)
MCV RBC AUTO: 88 FL (ref 78–100)
MONOCYTES # BLD AUTO: 0.8 10E9/L (ref 0–1.3)
MONOCYTES NFR BLD AUTO: 11.3 %
NEUTROPHILS # BLD AUTO: 4.2 10E9/L (ref 1.6–8.3)
NEUTROPHILS NFR BLD AUTO: 60.6 %
PLATELET # BLD AUTO: 410 10E9/L (ref 150–450)
POTASSIUM SERPL-SCNC: 3.8 MMOL/L (ref 3.4–5.3)
PROT SERPL-MCNC: 7 G/DL (ref 6.8–8.8)
RBC # BLD AUTO: 4.75 10E12/L (ref 3.8–5.2)
SODIUM SERPL-SCNC: 140 MMOL/L (ref 133–144)
WBC # BLD AUTO: 7 10E9/L (ref 4–11)

## 2021-04-29 PROCEDURE — 80053 COMPREHEN METABOLIC PANEL: CPT | Performed by: FAMILY MEDICINE

## 2021-04-29 PROCEDURE — 36415 COLL VENOUS BLD VENIPUNCTURE: CPT | Performed by: FAMILY MEDICINE

## 2021-04-29 PROCEDURE — 84403 ASSAY OF TOTAL TESTOSTERONE: CPT | Mod: 90 | Performed by: FAMILY MEDICINE

## 2021-04-29 PROCEDURE — 99000 SPECIMEN HANDLING OFFICE-LAB: CPT | Performed by: FAMILY MEDICINE

## 2021-04-29 PROCEDURE — 99214 OFFICE O/P EST MOD 30 MIN: CPT | Performed by: FAMILY MEDICINE

## 2021-04-29 PROCEDURE — 85025 COMPLETE CBC W/AUTO DIFF WBC: CPT | Performed by: FAMILY MEDICINE

## 2021-04-29 RX ORDER — TESTOSTERONE GEL, 1% 10 MG/G
50 GEL TRANSDERMAL DAILY
Qty: 30 PACKET | Refills: 3 | Status: SHIPPED | OUTPATIENT
Start: 2021-04-29 | End: 2021-04-30

## 2021-04-29 ASSESSMENT — MIFFLIN-ST. JEOR: SCORE: 1277.05

## 2021-04-29 ASSESSMENT — PATIENT HEALTH QUESTIONNAIRE - PHQ9: SUM OF ALL RESPONSES TO PHQ QUESTIONS 1-9: 5

## 2021-05-03 LAB — TESTOST SERPL-MCNC: 551 NG/DL (ref 8–60)

## 2021-06-01 ENCOUNTER — TELEPHONE (OUTPATIENT)
Dept: FAMILY MEDICINE | Facility: CLINIC | Age: 26
End: 2021-06-01

## 2021-06-01 NOTE — TELEPHONE ENCOUNTER
Central Prior Authorization Team   Phone: [phone]      PA Initiation    Medication: Testim-Initiated  Insurance Company: [employer] ([facility]) - Phone [phone] Fax [phone]  Pharmacy Filling the Rx: [facility] [place], [place] - [address] [address] [place] [address] N, [address]  Filling Pharmacy Phone: [phone]  Filling Pharmacy Fax:    Start Date: 6/1/2021

## 2021-06-01 NOTE — TELEPHONE ENCOUNTER
Prior Authorization Approval    Authorization Effective Date: 6/1/2021  Authorization Expiration Date: 6/1/2022  Medication: Testim-APPROVED  Approved Dose/Quantity:   Reference #:     Insurance Company: [employer] ([facility]) - Phone [phone] Fax [phone]  Expected CoPay:       CoPay Card Available:      Foundation Assistance Needed:    Which Pharmacy is filling the prescription (Not needed for infusion/clinic administered): [facility] - [place], [place] - [address], [address]  Pharmacy Notified: Yes  Patient Notified: No    Pharmacy will notify patient when medication is ready.

## 2021-06-01 NOTE — TELEPHONE ENCOUNTER
**Please Do Not Close This Encounter**               ** Until This Has Been Addressed**          PRIOR AUTHORIZATION REQUIED PER INSURANCE       PATIENT:Gurwinder Anaya YOB: 1995          MEDICATION:Testim 50mg/5gm (1%) gel                    SIG:Place 1 packet (50 mg of testosterone) onto the skin daily Apply to the clean, dry intact skin of the shoulders, upper arms or abdomen.,        NDC:[address]-23      INSURANCE:[employer]       INSURANCE PHONE #:[phone]      ID #:[id number]      INSURANCE REJECT:Enter ICD-10 code provided by prescriber, Non-covered ICD-10 requires AFIA OREILLY Required call [phone], Drug Requires Prior Authorization      PHARMACY:[facility]      PHARMACY NPI:[id number]      PHARMACY PHONE #:[phone]                                                          Please let us know when Prior Auth approved/denied, prescriber refusal to complete prior auth or if you would like to change medication/discontinue                                                            Thank you

## 2021-08-31 ENCOUNTER — MYC MEDICAL ADVICE (OUTPATIENT)
Dept: FAMILY MEDICINE | Facility: CLINIC | Age: 26
End: 2021-08-31

## 2021-09-14 ENCOUNTER — OFFICE VISIT (OUTPATIENT)
Dept: FAMILY MEDICINE | Facility: CLINIC | Age: 26
End: 2021-09-14
Payer: COMMERCIAL

## 2021-09-14 VITALS
WEIGHT: 128 LBS | BODY MASS INDEX: 23.55 KG/M2 | HEIGHT: 62 IN | DIASTOLIC BLOOD PRESSURE: 94 MMHG | HEART RATE: 98 BPM | RESPIRATION RATE: 20 BRPM | TEMPERATURE: 97.5 F | SYSTOLIC BLOOD PRESSURE: 148 MMHG | OXYGEN SATURATION: 100 %

## 2021-09-14 DIAGNOSIS — Z78.9 FEMALE-TO-MALE TRANSGENDER PERSON: Primary | ICD-10-CM

## 2021-09-14 DIAGNOSIS — F90.2 ADHD (ATTENTION DEFICIT HYPERACTIVITY DISORDER), COMBINED TYPE: ICD-10-CM

## 2021-09-14 DIAGNOSIS — Z23 NEED FOR PROPHYLACTIC VACCINATION AND INOCULATION AGAINST INFLUENZA: ICD-10-CM

## 2021-09-14 DIAGNOSIS — F32.5 DEPRESSION, MAJOR, IN REMISSION (H): ICD-10-CM

## 2021-09-14 PROCEDURE — 90471 IMMUNIZATION ADMIN: CPT | Performed by: FAMILY MEDICINE

## 2021-09-14 PROCEDURE — 90686 IIV4 VACC NO PRSV 0.5 ML IM: CPT | Performed by: FAMILY MEDICINE

## 2021-09-14 PROCEDURE — 99213 OFFICE O/P EST LOW 20 MIN: CPT | Mod: 25 | Performed by: FAMILY MEDICINE

## 2021-09-14 ASSESSMENT — MIFFLIN-ST. JEOR: SCORE: 1278.85

## 2021-09-14 NOTE — NURSING NOTE
Prior to immunization administration, verified patients identity using patient s name and date of birth. Please see Immunization Activity for additional information.     Screening Questionnaire for Adult Immunization    Are you sick today?   No   Do you have allergies to medications, food, a vaccine component or latex?   No   Have you ever had a serious reaction after receiving a vaccination?   No   Do you have a long-term health problem with heart, lung, kidney, or metabolic disease (e.g., diabetes), asthma, a blood disorder, no spleen, complement component deficiency, a cochlear implant, or a spinal fluid leak?  Are you on long-term aspirin therapy?   No   Do you have cancer, leukemia, HIV/AIDS, or any other immune system problem?   No   Do you have a parent, brother, or sister with an immune system problem?   No   In the past 3 months, have you taken medications that affect  your immune system, such as prednisone, other steroids, or anticancer drugs; drugs for the treatment of rheumatoid arthritis, Crohn s disease, or psoriasis; or have you had radiation treatments?   No   Have you had a seizure, or a brain or other nervous system problem?   No   During the past year, have you received a transfusion of blood or blood    products, or been given immune (gamma) globulin or antiviral drug?   No   For women: Are you pregnant or is there a chance you could become       pregnant during the next month?   No   Have you received any vaccinations in the past 4 weeks?   No     Immunization questionnaire answers were all negative.        Per orders of Dr. Mistry, injection of Fluzone given by Romy Barrientos MA. Patient instructed to remain in clinic for 15 minutes afterwards, and to report any adverse reaction to me immediately.       Screening performed by Romy Barrientos MA on 9/14/2021 at 3:50 PM.  Romy Barrientos MA on 9/14/2021 at 3:50 PM

## 2021-09-14 NOTE — LETTER
2021      Kinza Purdy  [address]  [place] [address]      I,Gianni Mistry MD,[place] # [id number], [place] issue,VICK [id number], NPI [id number], am the physician of Kinza Purdy AKALEXSANDRA Christine Purdy,  1995,with whom I have a doctor/patient relationship and whom I have treated for gender dysphoria.    Kinza Purdy has had appropriate clinical treament for gender transition to the new gender of male.  My patient began masculinization hormone therapy prescribed by me, in late .  It is a documented fact that after 6 months of masculinization hormone therapy, irreversible physical changes are solidified.  Kinza Purdy has experienced both onset and ongoing changes representing permanent physical change.     I declare under penalty of perjury under the laws of the [place] that the foregoing is true and correct.    Physician's Signature       Gianni Mistry MD  [facility]  [facility]  [address]  [place] [facility] [address]  [phone]  [phone]  2021

## 2021-09-14 NOTE — PROGRESS NOTES
"        Shyam King is a 26 year old who presents for the following health issues     HPI     Medication Followup of testim topical gel    Taking Medication as prescribed: yes    Side Effects:  None    Medication Helping Symptoms:  yes       >6 months on T now  Noticing ssome skin and hair changes.      Background:    Started thinking about gender not matching body in middle school  Had been a bit to anxious about it to really talk much to medical providers, did talk with his PCP  Has a therapist  Family/sister is supportive    Recent visit:On atomoxetine 80 mg daily. The medication is very helpful for keeping her focused and accomplishing tasks. If she doesn't take it, she doesn't get anything done. No significant issues with sleep. Stopped drinking lemonade at work and her intermittent chest pains went away. Appetite is \"fine\".     Is also taking fluoxetine 40 mg daily. Mood is good. Is working on learning to drive. PHQ9 score of 4 (down from 10). The patient has no thoughts of hurting self or others. GAD7 3 (down from 5).     Has been working ([email or web address] in [place]), playing [employer] and Dragons online.      Since high school he has felt that he  identifies as a male.  Any special concerns today?  anxious    On hormones?  yes    Gender affirmation is being sought in these other ways: trans/nonbinary community involvement, pronoun support at work/school/home    Apparently he had a phone consulation with planned parent donovan but did not get an Rx      S.O.G.I.  Patient's Preferred First Name:  Christine  Patient's pronouns:  he/him/his       Patient's sexual orientation:  Don't know  Patient's gender identity:  Male  Patient's sex assigned at birth:  Female    Steps patient has taken to transition, if any:     Presentation aligned with gender identity                Patient's future plans to transition, if any:       Organs the patient currently has:     breasts  cervix  ovaries  uterus  vagina         Organs " "present at birth or expected at birth to develop:     breasts  cervix  ovaries  uterus  vagina         Organs hormonally enhanced or developed:          Organs surgically enhanced or constructed:                Review of Systems   Constitutional, HEENT, cardiovascular, pulmonary, GI, , musculoskeletal, neuro, skin, endocrine and psych systems are negative, except as otherwise noted.      Objective    BP (!) 148/94 (Patient Position: Sitting, Cuff Size: Adult Regular)   Pulse 98   Temp 97.5  F (36.4  C) (Temporal)   Resp 20   Ht 1.575 m (5' 2\")   Wt 58.1 kg (128 lb)   SpO2 100%   BMI 23.41 kg/m    Body mass index is 23.41 kg/m .  Physical Exam   GENERAL: healthy, alert and no distress  PSYCH: mentation appears normal and anxious      Assessment & Plan     Female-to-male transgender person    Noticing changes at a normal pace  Some acne, managable    We are deferring cervical cancer screening.  Pt is low risk anyway, has not been sexually active    Depression, major, in remission (H)    ADHD (attention deficit hyperactivity disorder), combined type    Also discussed that we will assume care for these above issues when he comes to the end of his next medication cycle        Gianni Mistry MD  [facility]            "

## 2022-01-06 DIAGNOSIS — Z78.9 FEMALE-TO-MALE TRANSGENDER PERSON: ICD-10-CM

## 2022-01-07 RX ORDER — TESTOSTERONE 50 MG/5G
GEL TRANSDERMAL
Qty: 30 PACKET | Refills: 1 | Status: SHIPPED | OUTPATIENT
Start: 2022-01-07 | End: 2022-09-14

## 2022-01-07 NOTE — TELEPHONE ENCOUNTER
JF,    Routing refill request to provider for review/approval because:  Androgen Agents Failed 01/06/2022 03:19 PM   Protocol Details  Lipid panel on file in past 12 mos    Refills for this classification require provider review    Blood pressure under 140/90 in past 6 months    Patient is of age 12 and older     Last OV 9/14/21.  Due for follow up in March 2022.    Thanks,  Marisela Brennan RN

## 2022-02-13 ENCOUNTER — HEALTH MAINTENANCE LETTER (OUTPATIENT)
Age: 27
End: 2022-02-13

## 2022-07-13 ENCOUNTER — MEDICAL CORRESPONDENCE (OUTPATIENT)
Dept: HEALTH INFORMATION MANAGEMENT | Facility: CLINIC | Age: 27
End: 2022-07-13

## 2022-07-18 ENCOUNTER — TELEPHONE (OUTPATIENT)
Dept: PSYCHOLOGY | Facility: CLINIC | Age: 27
End: 2022-07-18

## 2022-07-18 NOTE — TELEPHONE ENCOUNTER
Date: 2022    Client Name:  Kinza Purdy  Preferred Name: Christine  MRN: [id number]   : 1995  Age:  26 year old    Presenting Problem / Reason for Assessment:     Letter of Support for top surgery    Suggested Program:  TG    Interested in Couples/Family Therapy?  No    Any legal charges pending?:   No    Patient wishes to be contacted regarding Insurance benefits?:  Yes    Insurance Benefits to be evaluated.  Note will be entered when validated.    Please Verify Registration    Please send Welcome Packet and document date sent.

## 2022-08-08 ENCOUNTER — MEDICAL CORRESPONDENCE (OUTPATIENT)
Dept: HEALTH INFORMATION MANAGEMENT | Facility: CLINIC | Age: 27
End: 2022-08-08

## 2022-08-15 ENCOUNTER — VIRTUAL VISIT (OUTPATIENT)
Dept: PSYCHOLOGY | Facility: CLINIC | Age: 27
End: 2022-08-15
Payer: COMMERCIAL

## 2022-08-15 DIAGNOSIS — F84.0 AUTISM SPECTRUM DISORDER: ICD-10-CM

## 2022-08-15 DIAGNOSIS — F64.0 GENDER DYSPHORIA IN ADOLESCENT AND ADULT: Primary | ICD-10-CM

## 2022-08-15 DIAGNOSIS — F90.2 ADHD (ATTENTION DEFICIT HYPERACTIVITY DISORDER), COMBINED TYPE: ICD-10-CM

## 2022-08-15 PROCEDURE — 90791 PSYCH DIAGNOSTIC EVALUATION: CPT | Mod: GT | Performed by: MARRIAGE & FAMILY THERAPIST

## 2022-08-15 NOTE — PROGRESS NOTES
[facility] - Progress Note    [facility]  Program in Human Sexuality  Department of Family Medicine & Community Health  [facility] [employer]   [address]               [place] [address]  Phone: [phone]  Fax: [phone]  [email or web address]    Transgender Diagnostic (TG) Diagnostic Assessment Interview      Date of Service: 8/15/22  Client Name: Kinza Purdy  YOB: 1995  27 year old  MRN:  [id number]  Gender/Gender Identity: Male  Treating Provider: Edith Almonte PsyD, LMFT  Program: TG  Type of Session: Assessment  Present in Session: Client   Number of Minutes:  55                 Video start time: 2:00  Video end time:2:55    Telemedicine Visit: The patient's condition can be safely assessed and treated via synchronous audio and visual telemedicine encounter.      Reason for Telemedicine Visit: Services only offered telehealth    Originating Site (Patient Location): Patient's home    Distant Site (Provider Location): Provider Remote Setting- Home Office    Consent:  The patient/guardian has verbally consented to: the potential risks and benefits of telemedicine (video visit) versus in person care; bill my insurance or make self-payment for services provided; and responsibility for payment of non-covered services.     Mode of Communication:  Video Conference via [employer]    As the provider I attest to compliance with applicable laws and regulations related to telemedicine.             Ethnicity:    Any relevant cultural/Jew issues? None of note  Referral Source Physician    Chief Complaint/Presenting Problem and Goals:  Client is a birth assigned female who identifies as a transgender male. Client is seeking a letter of support for top surgery at this time due to persistent and longstanding gender  "dysphoria.  ________________________________________________________________    Transgender Health Services  Program-Specific Information    History of gender dysphoria   When was the first time you felt your gender did not fit your sex assigned at birth? Client reported that this was not super conscious, but hit puberty and hated it. Client reported daydreaming about being able to pass as male. By the end of high school used they/them pronouns at school/friends. Client reported that his friends were great when he came out. Client reported trying to explore gender through clothing in high school. Client reported that he really hated getting breasts, and didn't want them, complained about them often.     Body Image/Anatomical dysphoria:  How do you feel about your body? Client reported that he is kind of \"whatever\" about the rest of his body. Client reported that he gets self-conscious/frustrated about his height.    What would you like to see changed? Client is pursuing top surgery at this time.     Client has been on testosterone for over a year (02/2021). Client is pursuing legal name change.     Does dysphoria impact your ability to be sexual? No. Client is aromatic/asexual.    Social Supports:   Who are your social supports? Client see's mom fairly regularly, has good friend/roommate, and good friend that he see's in person. Client has a strong online community (discord/anime/racquel)    ________________________________________________________________________    Mental Health History: Client has been diagnosed with persistent depressive disorder since childhood. Client has been on antidepressants since high school and it is well controlled. Client has tried therapy in the past several times, but does not think its helpful. Client has ADHD that is treated with Strattera. Client was diagnosed with Autism.      Client reported suicidal ideation, attempts most recently in 2018 following bad living " "situation/relationship.     Verbally administer [place] - Suicide Severity Rating Scale (C-SSRS). Use \"Screenings\" tab (Epic left side bar)    Substance Use: No substance use of note.  Medical History: Client reported migraines sporadically.     Relationships/Social History    Family History : Client grew up with mom, dad and younger sister in [place], [place]. Client reported that his mom \"tried but not very hard\" and dad didn't try/didn't acknowledge clients gender. Client reported that mom uses correct name/pronouns. Client reported that he gets along with his parents fine, client reported that relationship with sister is very focused on her/her needs. Client described upbringing as \"fine.\" Client reported that sister and dad fought a lot.     Current Significant Relationship/Partner: Client denied.    Educational History BA from 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    _________________________________________________________________________     CONCLUSIONS    Mental Status   Appearance:  Casually dressed, Well groomed and Appears younger than stated age  Behavior/relationship to examiner/demeanor:  Cooperative  Orientation: Oriented to person, place, time and situation  Speech Rate:  Normal  Speech Spontaneity:  Normal  Mood:  neutral  Affect:  Anxious/Nervous  Thought Process (Associations):  Logical and Linear  Thought Content:  no evidence of suicidal or homicidal ideation  Abnormal Perception:  None  Attention/Concentration:  Fair  Language:  Intact  Insight:  Adequate  Judgment:  Good      DSM-5 Diagnosis:    Encounter Diagnoses   Name Primary?     Gender dysphoria in adolescent and adult Yes     ADHD (attention deficit hyperactivity disorder), combined type      Autism spectrum disorder        Interactive Complexity  Communication difficulties present during current the psychiatric procedure " included:  1. N/A  Conclusions/Recommendations/Initial Treatment Goals:     The client is a 27 year old Not  or  person assigned female at birth who identifies as male. Based on the client's current report of symptoms, client meets criteria for gender dysphoria. The client's mental health concerns affect client's ability to function socially and have been causing clinically significant distress. The client reports no drug/alcohol use. Client denies safety concerns. Based on the client's reported symptoms and impact on functioning, the plan for the patient is:  1. Start supportive individual/family therapy with a provider specialized in gender health.  2. Continue to assess the impact of client's family and relational patterns on their current well-being.   3. Coordinate care as needed with medical, psychological, and family providers specifically gender care.    Signature/Title      Edith Almonte PsyD, KOREYFT

## 2022-08-15 NOTE — LETTER
August 15, 2022     Dr. Eric Ross  [address], [place]   [address]    Re: Christine Purdy   : 1995   Primary letter of support for gender confirmation surgery    Dear Dr. Ross:      It is my pleasure to write this letter of referral for my patient, Christine, who has received services through the [facility] since. Christine is a 27 year old transgender male who initially sought services to explore his gender identity and make decisions about medical interventions to address his gender dysphoria. His most recent appointment for individual psychotherapy occurred on 8/15/22.  I feel qualified to write this letter of referral in my role as a licensed marriage and family therapist. I am a gender specialist with 6 years of experience and feel confident in my diagnostic assessment and referral of this patient for medical intervention services.      Christine completed an individual diagnostic session. He completed a thorough psychological evaluation consisting of completion of a set of evaluative measures of gender dysphoria, psychosocial adjustment, sexual health, and overall psychological functioning. Christine has been diagnosed with gender dysphoria. He has exhibited a persistent and long standing gender and anatomic dysphoria related to his birth assigned sex since puberty. His socially transitioned to live in full-time in his affirmed gender since . Christine started masculinizing hormones in 2021. Despite these changes, Christine continues to experience anatomical dysphoria, primarily chest dysphoria.     Additionally, Christine has been diagnosed with persistent depressive disorder, and has been prescribed Prozac for the treatment of this with positive results. Christine also has a diagnosis of ADHD (uses Strattera) and Autism Spectrum Disorder, but neither pose a barrier at this time. Christine's mental health concerns are stable  and managed, and do not pose any barrier to his ability to consent and undergo gender affirming surgery at this time.      Christine currently meets the World Professional Association for Transgender Health (WPATH) Standards of Care for surgical gender affirmation for gender dysphoric persons. Surgery and affiliated medical interventions to reduce gender dysphoria are considered medically necessary treatments by the WPATH Standards of Care for transgender persons. I support his decision to pursue medically necessary surgery at this time and attest to his ability to make an informed consensual decision about his care. He are over the age of consent in his state of residence. Gender affirmation surgery will decrease his gender dysphoria and psychological distress and improve his overall psychological functioning.  In some cases, denial of access to this surgery can lead to increased isolation, decreased work performance, and decreased sexual and interpersonal functioning. Surgery is expected to increase comfort with self, and improve interpersonal, sexual, and vocational functioning.     Christine has educated themself about the surgery and is aware of its risks and benefits. Given his concerns and the availability of this type of surgery, I support his choice of surgeon. We have thoroughly discussed his expectations and the surgical process including pre-operative planning and preparation. We have identified an aftercare and support plan, which includes continuation of supportive psychotherapy throughout recovery. He is well adjusted and thoughtful, and have approached the decision making for surgery with intention and care.     This letter of referral is based on the psychological indication for surgery and did not include a physical examination to assess medical contra-indications for the surgical procedure. This letter of referral is valid for one year from the date of approval.       If you have any questions, or are  in need of additional information, please do not hesitate to contact me.       Sincerely,             Edith Almonte PsyD, MEGHAN    Program in Human Sexuality/Center for Sexual Health  Department of Family Medicine and Community Health  [facility]

## 2022-08-26 ENCOUNTER — MYC MEDICAL ADVICE (OUTPATIENT)
Dept: PSYCHOLOGY | Facility: CLINIC | Age: 27
End: 2022-08-26

## 2022-09-13 ENCOUNTER — TELEPHONE (OUTPATIENT)
Dept: FAMILY MEDICINE | Facility: CLINIC | Age: 27
End: 2022-09-13

## 2022-09-13 DIAGNOSIS — Z78.9 FEMALE-TO-MALE TRANSGENDER PERSON: ICD-10-CM

## 2022-09-13 PROBLEM — F64.0 GENDER DYSPHORIA IN ADULT: Status: ACTIVE | Noted: 2020-07-23

## 2022-09-13 NOTE — TELEPHONE ENCOUNTER
Prior Authorization Retail Medication Request    Medication/Dose: testim 50mg 1%gel, qty 150  ICD code (if different than what is on RX):   Patient Active Problem List   Diagnosis Code     Gender dysphoria in adult F64.0       Previously Tried and Failed:  Na.  Cannot to injectable     Rationale: Stabel on this med    Insurance Name:  [facility]   Insurance ID:  [id number]      Pharmacy Information (if different than what is on RX)

## 2022-09-14 RX ORDER — TESTOSTERONE 1.62 MG/G
2 GEL TRANSDERMAL DAILY
Qty: 75 G | Refills: 5 | Status: SHIPPED | OUTPATIENT
Start: 2022-09-14

## 2022-09-14 NOTE — TELEPHONE ENCOUNTER
Central Prior Authorization Team   Phone: [phone]      PA Initiation    Medication: Testim 50mg /5gm 1%  Insurance Company: [employer] - Phone [phone] Fax [phone]  Pharmacy Filling the Rx: [facility] [place], [place] - [address] [address]  Filling Pharmacy Phone: [phone]  Filling Pharmacy Fax:    Start Date: 9/14/2022

## 2022-09-14 NOTE — TELEPHONE ENCOUNTER
Called pharmacy.  Carmina from pharmacy states Androgel wasn't covered.  New insurance thinks there is a current prescription from his previous PCP.  Carmina from pharmacy will have patient call insurance to resolve issue with insurance.    RN instructed pharmacy to call back if there any further concerns.     Shekhar Flores RN

## 2022-09-14 NOTE — TELEPHONE ENCOUNTER
Received information from insurance, can the patient be switched to a preferred product?  If so, please send new RX to the pharmacy.  If not, please provide medical justification as to why the formulary medications would not be as effective and route back to me.

## 2022-09-23 ENCOUNTER — TELEPHONE (OUTPATIENT)
Dept: PSYCHOLOGY | Facility: CLINIC | Age: 27
End: 2022-09-23

## 2022-10-16 ENCOUNTER — HEALTH MAINTENANCE LETTER (OUTPATIENT)
Age: 27
End: 2022-10-16

## 2022-12-21 ENCOUNTER — VIRTUAL VISIT (OUTPATIENT)
Dept: PSYCHOLOGY | Facility: CLINIC | Age: 27
End: 2022-12-21
Payer: COMMERCIAL

## 2022-12-21 DIAGNOSIS — Z53.9 ERRONEOUS ENCOUNTER--DISREGARD: Primary | ICD-10-CM

## 2022-12-22 ENCOUNTER — TELEPHONE (OUTPATIENT)
Dept: PSYCHOLOGY | Facility: CLINIC | Age: 27
End: 2022-12-22

## 2023-01-09 NOTE — TELEPHONE ENCOUNTER
Please send patient MyChart msg to remind her to come in for PAP please informed pt that we have early apps starting at 7AM where their is minimal patient traffic.    dietary tray ordered     Svetlana Bryan RN  01/09/23 4840

## 2023-03-26 ENCOUNTER — HEALTH MAINTENANCE LETTER (OUTPATIENT)
Age: 28
End: 2023-03-26

## 2023-03-30 DIAGNOSIS — Z78.9 FEMALE-TO-MALE TRANSGENDER PERSON: ICD-10-CM

## 2023-04-03 RX ORDER — TESTOSTERONE 16.2 MG/G
GEL TRANSDERMAL
Qty: 75 G | Refills: 5 | OUTPATIENT
Start: 2023-04-03

## 2023-04-03 NOTE — TELEPHONE ENCOUNTER
HI I am no longer in the clinic as of 12/16/22, Urgent Care only, please forward to [place] or  provider pool

## 2023-04-04 NOTE — TELEPHONE ENCOUNTER
Called pt - pt confirms does not need us to bridge medication, he has found a new provider and will have that doctor refill meds.      Closing encounter

## 2023-07-10 ENCOUNTER — HOSPITAL ENCOUNTER (EMERGENCY)
Facility: CLINIC | Age: 28
Discharge: HOME | End: 2023-07-10
Attending: FAMILY MEDICINE | Admitting: FAMILY MEDICINE
Payer: COMMERCIAL

## 2023-07-10 VITALS
RESPIRATION RATE: 18 BRPM | OXYGEN SATURATION: 99 % | DIASTOLIC BLOOD PRESSURE: 90 MMHG | BODY MASS INDEX: 26.34 KG/M2 | SYSTOLIC BLOOD PRESSURE: 149 MMHG | WEIGHT: 139.5 LBS | TEMPERATURE: 98.6 F | HEART RATE: 101 BPM | HEIGHT: 61 IN

## 2023-07-10 DIAGNOSIS — F41.9 ANXIETY: ICD-10-CM

## 2023-07-10 DIAGNOSIS — H81.399 PERIPHERAL VERTIGO, UNSPECIFIED LATERALITY: ICD-10-CM

## 2023-07-10 PROCEDURE — 99284 EMERGENCY DEPT VISIT MOD MDM: CPT | Performed by: FAMILY MEDICINE

## 2023-07-10 PROCEDURE — 99283 EMERGENCY DEPT VISIT LOW MDM: CPT

## 2023-07-10 PROCEDURE — 250N000013 HC RX MED GY IP 250 OP 250 PS 637: Performed by: FAMILY MEDICINE

## 2023-07-10 RX ORDER — HYDROXYZINE HYDROCHLORIDE 25 MG/1
25-50 TABLET, FILM COATED ORAL EVERY 6 HOURS PRN
Qty: 20 TABLET | Refills: 1 | Status: SHIPPED | OUTPATIENT
Start: 2023-07-10

## 2023-07-10 RX ORDER — HYDROXYZINE HYDROCHLORIDE 25 MG/1
25 TABLET, FILM COATED ORAL ONCE
Status: COMPLETED | OUTPATIENT
Start: 2023-07-10 | End: 2023-07-10

## 2023-07-10 RX ORDER — LORATADINE 10 MG/1
10 TABLET ORAL DAILY
COMMUNITY

## 2023-07-10 RX ADMIN — HYDROXYZINE HYDROCHLORIDE 25 MG: 25 TABLET, FILM COATED ORAL at 14:22

## 2023-07-10 NOTE — DISCHARGE INSTRUCTIONS
Thank you for choosing [facility].     Please closely monitor for further symptoms. Return to the Emergency Department if you develop any new or worsening signs or symptoms.    If you received any opiate pain medications or sedatives during your visit, please do not drive for at least 8 hours.     Labs, cultures or final xray interpretations may still need to be reviewed.  We will call you if your plan of care needs to be changed.    Please follow up with your primary care physician or clinic.

## 2023-07-10 NOTE — ED PROVIDER NOTES
"ED Provider Note  [facility]      History     Chief Complaint   Patient presents with     Dizziness     States has had vertigo in past.  Swaying feeling.  Goes by [employer]/ He /Him.       HPI  Kinza Purdy is a 27 year old adult who presents to the ER for \"vertigo\".  Patient states on Friday they were at work as a , and had several brief episodes in which they would move or change position and would feel a \"swaying feeling\" as if the ground was moving under their feet.  They did not feel lightheaded or faint and there was no actual spinning.  This occurred multiple times throughout the day.  They have experienced similar symptoms in the past when dehydrated and have been previously diagnosed with peripheral vertigo.  They have recently had some congestion of the sinuses nose and a mild sore throat.  They have some mild frontal pressure no other headache.  No fever or chills, no tinnitus, no hearing loss.  No ear pain.  Denies visual change, speech disturbance, difficulty swallowing, numbness, weakness, tingling, and has not fallen or staggered and walking.  Episodes have persisted, they are very brief and they believe are related to movements occurring multiple times a day all weekend still prominent today but less prominent.  In addition the patient states they are having frequent panic attacks since having COVID last February.  This is manifest as anxiety, palpitations, sweating and feeling extremely anxious.  They are not taking medications but have generally been able to get rid of these by mental redirection.  They are interested in some treatment for anxiety.  Denying any thoughts of harm to self or others and have not discussed this with her primary physician yet.    Past Medical History  Past Medical History:   Diagnosis Date     Allergy      History reviewed. No pertinent surgical history.  atomoxetine (STRATTERA) 80 MG capsule  FLUoxetine (PROZAC) 40 MG " "capsule  hydrOXYzine (ATARAX) 25 MG tablet  loratadine (CLARITIN) 10 MG tablet  testosterone (ANDROGEL 1.62 % PUMP) 20.25 MG/ACT gel      Allergies   Allergen Reactions     Cat Hair Extract Itching     Family History  Family History   Problem Relation Age of Onset     Depression Mother      Hypertension Father      Diabetes Maternal Grandmother      Social History   Social History     Tobacco Use     Smoking status: Never     Smokeless tobacco: Never   Vaping Use     Vaping Use: Never used   Substance Use Topics     Alcohol use: Yes     Comment: rare     Drug use: Never         A medically appropriate review of systems was performed with pertinent positives and negatives noted in the HPI, and all other systems negative.    Physical Exam   BP: (!) 153/96  Pulse: 102  Temp: 98.6  F (37  C)  Resp: 16  Height: 154.9 cm (5' 1\")  Weight: 63.3 kg (139 lb 8 oz)  SpO2: 99 %  Physical Exam  Vitals and nursing note reviewed.   Constitutional:       General: He is not in acute distress.     Appearance: Normal appearance. He is not diaphoretic.   HENT:      Head: Atraumatic.      Mouth/Throat:      Mouth: Mucous membranes are moist.   Eyes:      General: No scleral icterus.     Extraocular Movements:      Right eye: Nystagmus present.      Left eye: Nystagmus present.      Conjunctiva/sclera: Conjunctivae normal.      Comments: Patient does have several beats of horizontal nystagmus which is most prominent when gazing to the right and appears to be rapid component towards the right in all fields of gaze   Cardiovascular:      Rate and Rhythm: Normal rate.      Heart sounds: Normal heart sounds.   Pulmonary:      Effort: No respiratory distress.      Breath sounds: Normal breath sounds.   Abdominal:      General: Abdomen is flat.   Musculoskeletal:      Cervical back: Neck supple.   Skin:     General: Skin is warm.      Findings: No rash.   Neurological:      General: No focal deficit present.      Mental Status: He is alert and " oriented to person, place, and time.      Cranial Nerves: Cranial nerves 2-12 are intact.      Sensory: Sensation is intact.      Motor: Motor function is intact.      Coordination: Coordination is intact. Romberg sign negative. Coordination normal. Finger-Nose-Finger Test and Heel to Shin Test normal. Rapid alternating movements normal.      Gait: Gait is intact.   Psychiatric:         Attention and Perception: Attention normal.         Mood and Affect: Mood is anxious.         Speech: Speech normal.         Behavior: Behavior normal.         Thought Content: Thought content normal.         Cognition and Memory: Cognition normal.         Judgment: Judgment normal.           ED Course, Procedures, & Data      Procedures                      No results found for any visits on 07/10/23.  Medications   hydrOXYzine (ATARAX) tablet 25 mg (has no administration in time range)     Labs Ordered and Resulted from Time of ED Arrival to Time of ED Departure - No data to display  No orders to display          Critical care was not performed.     Medical Decision Making  The patient's presentation was of moderate complexity (an acute illness with systemic symptoms).    The patient's evaluation involved:  review of external note(s) from 2 sources (Viewed clinic notes from ED visit at [facility] for chest pain and outpatient psychology notes)    The patient's management necessitated moderate risk (prescription drug management including medications given in the ED).      Assessment & Plan    27-year-old patient with a history of major depression, ADHD, anxiety, transgender male to male, presenting with multiple brief episodes of acute self-limited vertigo.  Differential diagnosis includes but not limited to:  Central causes such as vertebro-basilar artery insufficiency, cerebellar hemorrhage or infarct, infection such as meningitis, CNS neoplasm, MS.  Also peripheral causes considered including benign positional vertigo, labyrinthitis,  vestibular neuronitis, otitis media, Ménière's disease, traumatic injury to the labyrinthine apparatus.  On exam patient is hypertensive, however this is not significantly changed when compared to multiple prior encounters.  Other vitals unremarkable.  Alert oriented x3 with normal mental status is somewhat anxious.  There is cerumen in both external auditory canals, no tenderness or drainage no mastoid tenderness, hearing normal.  There is horizontal nystagmus to the right as documented above.  Mental status normal, neck supple.  Cardiovascular exam and neurologic exam completely normal.  Though the patient does have a history of hypertension, I do not find any other red flags for central vertigo.  There is no significant associated headache, the neck is supple, no objective ataxia on exam, Romberg test negative.  They have no nausea or vomiting, no dysmetria, no dysdiaokokinesis.  No visual disturbance subjectively or objectively, no dysarthria or dysphagia, and no motor weakness or objective sensory deficit.  They were offered meclizine but declined, they are hydrating normally by mouth.  Do not see indication for labs or diagnostic studies clinically based on the entire presentation.  She did request medication for anxiety and was given hydroxyzine.  I feel the patient is stable to be treated symptomatically for what is likely a self-limited episode of peripheral vertigo related to recent nonspecific viral URI.  Given a prescription for hydroxyzine to be used as needed for anxiety encouraged him strongly to follow-up with her primary care physician.  We discussed the indications for emergency department return and follow-up.  Stable for discharge.      I have reviewed the nursing notes. I have reviewed the findings, diagnosis, plan and need for follow up with the patient.    New Prescriptions    HYDROXYZINE (ATARAX) 25 MG TABLET    Take 1-2 tablets (25-50 mg) by mouth every 6 hours as needed for anxiety        Final diagnoses:   Peripheral vertigo, unspecified laterality   Anxiety       Ronnie Griffin MD  [facility] EMERGENCY DEPARTMENT  7/10/2023     Ronnie Griffin MD  07/10/23 2528

## 2023-07-10 NOTE — ED NOTES
Pt discharged home at this time, ambulatory. Pt provided w/ printed and verbal discharge instructions at this time. Pt provided w/ printed and verbal discharge instructions at this time. Pt verbalized understanding of instructions and medication usage, questions answered at this time. Pt stable at time of discharge.

## 2024-02-19 ENCOUNTER — HOSPITAL ENCOUNTER (EMERGENCY)
Facility: CLINIC | Age: 29
Discharge: HOME | End: 2024-02-20
Attending: EMERGENCY MEDICINE
Payer: COMMERCIAL

## 2024-02-19 ENCOUNTER — APPOINTMENT (OUTPATIENT)
Dept: ULTRASOUND IMAGING | Facility: CLINIC | Age: 29
End: 2024-02-19
Attending: EMERGENCY MEDICINE
Payer: COMMERCIAL

## 2024-02-19 DIAGNOSIS — N30.00 ACUTE CYSTITIS WITHOUT HEMATURIA: ICD-10-CM

## 2024-02-19 LAB
ALBUMIN UR-MCNC: 10 MG/DL
APPEARANCE UR: CLEAR
BILIRUB UR QL STRIP: NEGATIVE
COLOR UR AUTO: ABNORMAL
GLUCOSE UR STRIP-MCNC: NEGATIVE MG/DL
HGB UR QL STRIP: NEGATIVE
KETONES UR STRIP-MCNC: NEGATIVE MG/DL
LEUKOCYTE ESTERASE UR QL STRIP: ABNORMAL
MUCOUS THREADS #/AREA URNS LPF: PRESENT /LPF
NITRATE UR QL: NEGATIVE
PH UR STRIP: 5.5 [PH] (ref 5–7)
RBC URINE: 1 /HPF
SP GR UR STRIP: 1.02 (ref 1–1.03)
SQUAMOUS EPITHELIAL: <1 /HPF
UROBILINOGEN UR STRIP-MCNC: NORMAL MG/DL
WBC URINE: 63 /HPF

## 2024-02-19 PROCEDURE — 87086 URINE CULTURE/COLONY COUNT: CPT | Performed by: EMERGENCY MEDICINE

## 2024-02-19 PROCEDURE — 81001 URINALYSIS AUTO W/SCOPE: CPT | Performed by: EMERGENCY MEDICINE

## 2024-02-19 PROCEDURE — 99284 EMERGENCY DEPT VISIT MOD MDM: CPT | Mod: 25 | Performed by: EMERGENCY MEDICINE

## 2024-02-19 PROCEDURE — 99284 EMERGENCY DEPT VISIT MOD MDM: CPT | Performed by: EMERGENCY MEDICINE

## 2024-02-19 PROCEDURE — 76705 ECHO EXAM OF ABDOMEN: CPT

## 2024-02-19 ASSESSMENT — ACTIVITIES OF DAILY LIVING (ADL): ADLS_ACUITY_SCORE: 35

## 2024-02-20 ENCOUNTER — APPOINTMENT (OUTPATIENT)
Dept: ULTRASOUND IMAGING | Facility: CLINIC | Age: 29
End: 2024-02-20
Attending: EMERGENCY MEDICINE
Payer: COMMERCIAL

## 2024-02-20 VITALS
HEART RATE: 77 BPM | BODY MASS INDEX: 26.47 KG/M2 | HEIGHT: 61 IN | SYSTOLIC BLOOD PRESSURE: 156 MMHG | TEMPERATURE: 98.8 F | DIASTOLIC BLOOD PRESSURE: 93 MMHG | RESPIRATION RATE: 18 BRPM | WEIGHT: 140.2 LBS | OXYGEN SATURATION: 98 %

## 2024-02-20 PROCEDURE — 250N000013 HC RX MED GY IP 250 OP 250 PS 637: Performed by: EMERGENCY MEDICINE

## 2024-02-20 PROCEDURE — 93971 EXTREMITY STUDY: CPT | Mod: LT

## 2024-02-20 RX ORDER — CEPHALEXIN 500 MG/1
500 CAPSULE ORAL 3 TIMES DAILY
Qty: 21 CAPSULE | Refills: 0 | Status: SHIPPED | OUTPATIENT
Start: 2024-02-20 | End: 2024-02-20

## 2024-02-20 RX ORDER — CEPHALEXIN 500 MG/1
500 CAPSULE ORAL 3 TIMES DAILY
Qty: 21 CAPSULE | Refills: 0 | Status: SHIPPED | OUTPATIENT
Start: 2024-02-20

## 2024-02-20 RX ORDER — CEPHALEXIN 500 MG/1
500 CAPSULE ORAL EVERY 6 HOURS SCHEDULED
Status: DISCONTINUED | OUTPATIENT
Start: 2024-02-20 | End: 2024-02-20 | Stop reason: HOSPADM

## 2024-02-20 RX ADMIN — CEPHALEXIN 500 MG: 500 CAPSULE ORAL at 00:37

## 2024-02-20 ASSESSMENT — ENCOUNTER SYMPTOMS: LEG PAIN: 1

## 2024-02-20 NOTE — ED TRIAGE NOTES
Triage Assessment (Adult)       Row Name 02/19/24 5425          Triage Assessment    Airway WDL WDL        Respiratory WDL    Respiratory WDL WDL        Skin Circulation/Temperature WDL    Skin Circulation/Temperature WDL WDL        Cardiac WDL    Cardiac WDL WDL        Peripheral/Neurovascular WDL    Peripheral Neurovascular WDL WDL        Cognitive/Neuro/Behavioral WDL    Cognitive/Neuro/Behavioral WDL WDL

## 2024-02-20 NOTE — ED PROVIDER NOTES
ED Provider Note  [facility]      History     Chief Complaint   Patient presents with    Leg Pain     Left calf pain that started this morning, no swelling noted    Abdominal Pain     Intermittent RUQ abdominal pain, sharp pain and localized, denies diarrhea nor constipation. Pt endorses urinary frequency       Leg Pain    Kinza Purdy is a 28 year old adult who presents with a chief complaint of dysuria + urinary frequency, intermittent RUQ abdominal pain, and left calf cramping.  Patient reports 1 day of symptoms.  No known history of blood clots however the patient is on testosterone which interrogated increased risk of clot formation.  Denies chest pain or shortness of breath.  Denies fevers or chills.  Patient has had urinary frequency and dysuria and is concerned about possible urinary tract infection.  No concern for sexually transmitted infections at this time.  With regard to the abdominal pain, patient has intermittent right upper quadrant abdominal pain that is sharp and localized to the right upper quadrant.  Denies nausea, vomiting, diarrhea.    Past Medical History  Past Medical History:   Diagnosis Date    Allergy      History reviewed. No pertinent surgical history.  cephALEXin (KEFLEX) 500 MG capsule  FLUoxetine (PROZAC) 40 MG capsule  hydrOXYzine (ATARAX) 25 MG tablet  loratadine (CLARITIN) 10 MG tablet  testosterone (ANDROGEL 1.62 % PUMP) 20.25 MG/ACT gel  atomoxetine (STRATTERA) 80 MG capsule      Allergies   Allergen Reactions    Cat Hair Extract Itching    Droperidol Unknown     Pt could not explain her s/sx however reported that she was feeling like she might die     Family History  Family History   Problem Relation Age of Onset    Depression Mother     Hypertension Father     Diabetes Maternal Grandmother      Social History   Social History     Tobacco Use    Smoking status: Never    Smokeless tobacco: Never   Vaping Use    Vaping Use: Never used  "  Substance Use Topics    Alcohol use: Yes     Comment: rare    Drug use: Never      Past medical history, past surgical history, medications, allergies, family history, and social history were reviewed with the patient. No additional pertinent items.      A medically appropriate review of systems was performed with pertinent positives and negatives noted in the HPI, and all other systems negative.    Physical Exam   BP: (!) 179/107  Pulse: 79  Temp: 98.8  F (37.1  C)  Resp: 18  Height: 154.9 cm (5' 1\")  Weight: 63.6 kg (140 lb 3.2 oz)  SpO2: 100 %  Physical Exam  Vitals and nursing note reviewed.   Constitutional:       General: He is not in acute distress.     Appearance: Normal appearance. He is not toxic-appearing.   HENT:      Head: Normocephalic and atraumatic.      Mouth/Throat:      Mouth: Mucous membranes are moist.      Pharynx: Oropharynx is clear.   Eyes:      Extraocular Movements: Extraocular movements intact.      Conjunctiva/sclera: Conjunctivae normal.   Cardiovascular:      Rate and Rhythm: Normal rate and regular rhythm.   Pulmonary:      Effort: Pulmonary effort is normal. No respiratory distress.   Abdominal:      Palpations: Abdomen is soft.      Tenderness: There is abdominal tenderness. There is no guarding or rebound.   Musculoskeletal:         General: No swelling or deformity.      Cervical back: Normal range of motion and neck supple.      Right lower leg: No edema.      Left lower leg: No edema.   Skin:     General: Skin is warm and dry.   Neurological:      General: No focal deficit present.      Mental Status: He is alert and oriented to person, place, and time.   Psychiatric:         Mood and Affect: Mood normal.         Behavior: Behavior normal.           ED Course, Procedures, & Data      Procedures                     Results for orders placed or performed during the hospital encounter of 02/19/24   US Abdomen Limited     Status: None    Narrative    EXAM: US ABDOMEN " LIMITED  LOCATION: [facility]  DATE: 2/20/2024    INDICATION: Right upper quadrant abdominal pain.  COMPARISON: None.  TECHNIQUE: Limited abdominal ultrasound.    FINDINGS:    GALLBLADDER: Normal distention with a normal wall thickness. No internal calculi or sludge. No pericholecystic fluid or reproducible sonographic Burnham's sign.    BILE DUCTS: No biliary dilatation. The common duct measures 3 mm.    LIVER: Normal parenchyma with smooth contour. No focal mass.    RIGHT KIDNEY: No hydronephrosis.    PANCREAS: The visualized portions are normal.    No ascites.      Impression    IMPRESSION:  1.  Normal limited abdominal ultrasound.       US Lower Extremity Venous Duplex Left     Status: None    Narrative    EXAM: US LOWER EXTREMITY VENOUS DUPLEX LEFT  LOCATION: [facility]  DATE: 2/20/2024    INDICATION: left lower extremity swelling   pain, concern for DVT  COMPARISON: None.  TECHNIQUE: Venous Duplex ultrasound of the left lower extremity with and without compression, augmentation and duplex. Color flow and spectral Doppler with waveform analysis performed.    FINDINGS: Exam includes the common femoral, femoral, popliteal, and contralateral common femoral veins as well as segmentally visualized deep calf veins and greater saphenous vein.     LEFT: No deep vein thrombosis. No superficial thrombophlebitis. No popliteal cyst.      Impression    IMPRESSION:  1.  No deep venous thrombosis in the left lower extremity.   UA with Microscopic reflex to Culture     Status: Abnormal    Specimen: Urine, Midstream   Result Value Ref Range    Color Urine Light Yellow Colorless, Straw, Light Yellow, Yellow    Appearance Urine Clear Clear    Glucose Urine Negative Negative mg/dL    Bilirubin Urine Negative Negative    Ketones Urine Negative Negative mg/dL    Specific Gravity Urine 1.019 1.003 - 1.035    Blood Urine Negative Negative    pH  Urine 5.5 5.0 - 7.0    Protein Albumin Urine 10 (A) Negative mg/dL    Urobilinogen Urine Normal Normal, 2.0 mg/dL    Nitrite Urine Negative Negative    Leukocyte Esterase Urine Large (A) Negative    Mucus Urine Present (A) None Seen /LPF    RBC Urine 1 <=2 /HPF    WBC Urine 63 (H) <=5 /HPF    Squamous Epithelials Urine <1 <=1 /HPF    Narrative    Urine Culture ordered based on laboratory criteria     Medications   cephALEXin (KEFLEX) capsule 500 mg (500 mg Oral $Given 2/20/24 0037)     Labs Ordered and Resulted from Time of ED Arrival to Time of ED Departure   ROUTINE UA WITH MICROSCOPIC REFLEX TO CULTURE - Abnormal       Result Value    Color Urine Light Yellow      Appearance Urine Clear      Glucose Urine Negative      Bilirubin Urine Negative      Ketones Urine Negative      Specific Gravity Urine 1.019      Blood Urine Negative      pH Urine 5.5      Protein Albumin Urine 10 (*)     Urobilinogen Urine Normal      Nitrite Urine Negative      Leukocyte Esterase Urine Large (*)     Mucus Urine Present (*)     RBC Urine 1      WBC Urine 63 (*)     Squamous Epithelials Urine <1     URINE CULTURE     US Lower Extremity Venous Duplex Left   Final Result   IMPRESSION:   1.  No deep venous thrombosis in the left lower extremity.      US Abdomen Limited   Final Result   IMPRESSION:   1.  Normal limited abdominal ultrasound.                   Critical care was not performed.     Medical Decision Making  The patient's presentation was of moderate complexity (an acute complicated injury).    The patient's evaluation involved:  review of external note(s) from 1 sources (see separate area of note for details)  review of 3+ test result(s) ordered prior to this encounter (see separate area of note for details)  ordering and/or review of 3+ test(s) in this encounter (see separate area of note for details)    The patient's management necessitated moderate risk (prescription drug management including medications given in the  ED).    Assessment & Plan      Kinza Purdy is a 28 year old adult who presents with a chief complaint of dysuria + urinary frequency, intermittent RUQ abdominal pain, and left calf cramping.  Patient is nontoxic-appearing on exam, hypertensive in triage, has other vital signs within normal limits.  There is some tenderness to the right upper quadrant without rebound or guarding.  Ultrasound abdomen obtained to further assess right upper quadrant abdominal pain, which came back negative for concerning pathology.  Doppler studies of left lower extremity obtained to assess for possibility of DVT and came back negative.  Urinalysis obtained and concerning for infection.  Patient started on Keflex and prescribed. Discharged with outpatient follow up and return precautions.     I have reviewed the nursing notes. I have reviewed the findings, diagnosis, plan and need for follow up with the patient.    New Prescriptions    CEPHALEXIN (KEFLEX) 500 MG CAPSULE    Take 1 capsule (500 mg) by mouth 3 times daily for 7 days       Final diagnoses:   Acute cystitis without hematuria       Sharad Moreno MD  [facility] EMERGENCY DEPARTMENT  2/19/2024     Sharad Moreno MD  02/20/24 0045

## 2024-02-21 LAB — BACTERIA UR CULT: NORMAL

## 2024-08-10 ENCOUNTER — HEALTH MAINTENANCE LETTER (OUTPATIENT)
Age: 29
End: 2024-08-10

## 2025-05-24 ENCOUNTER — HOSPITAL ENCOUNTER (EMERGENCY)
Facility: CLINIC | Age: 30
Discharge: HOME | End: 2025-05-24
Attending: FAMILY MEDICINE | Admitting: FAMILY MEDICINE
Payer: COMMERCIAL

## 2025-05-24 VITALS
TEMPERATURE: 98.2 F | DIASTOLIC BLOOD PRESSURE: 72 MMHG | OXYGEN SATURATION: 98 % | HEART RATE: 69 BPM | RESPIRATION RATE: 18 BRPM | SYSTOLIC BLOOD PRESSURE: 126 MMHG

## 2025-05-24 DIAGNOSIS — N39.0 URINARY TRACT INFECTION WITHOUT HEMATURIA, SITE UNSPECIFIED: ICD-10-CM

## 2025-05-24 LAB
ALBUMIN UR-MCNC: 50 MG/DL
AMORPH CRY #/AREA URNS HPF: ABNORMAL /HPF
APPEARANCE UR: CLEAR
BACTERIA #/AREA URNS HPF: ABNORMAL /HPF
BILIRUB UR QL STRIP: NEGATIVE
COLOR UR AUTO: YELLOW
GLUCOSE UR STRIP-MCNC: NEGATIVE MG/DL
HGB UR QL STRIP: NEGATIVE
HYALINE CASTS: 1 /LPF
KETONES UR STRIP-MCNC: NEGATIVE MG/DL
LEUKOCYTE ESTERASE UR QL STRIP: ABNORMAL
MUCOUS THREADS #/AREA URNS LPF: PRESENT /LPF
NITRATE UR QL: NEGATIVE
PH UR STRIP: 5.5 [PH] (ref 5–7)
RBC URINE: 1 /HPF
SP GR UR STRIP: 1.02 (ref 1–1.03)
SQUAMOUS EPITHELIAL: 5 /HPF
TRANSITIONAL EPI: <1 /HPF
UROBILINOGEN UR STRIP-MCNC: NORMAL MG/DL
WBC URINE: 12 /HPF

## 2025-05-24 PROCEDURE — 87088 URINE BACTERIA CULTURE: CPT | Performed by: FAMILY MEDICINE

## 2025-05-24 PROCEDURE — 99283 EMERGENCY DEPT VISIT LOW MDM: CPT | Performed by: FAMILY MEDICINE

## 2025-05-24 PROCEDURE — 81001 URINALYSIS AUTO W/SCOPE: CPT | Performed by: FAMILY MEDICINE

## 2025-05-24 RX ORDER — NITROFURANTOIN 25; 75 MG/1; MG/1
100 CAPSULE ORAL 2 TIMES DAILY
Qty: 14 CAPSULE | Refills: 0 | Status: SHIPPED | OUTPATIENT
Start: 2025-05-24

## 2025-05-24 ASSESSMENT — COLUMBIA-SUICIDE SEVERITY RATING SCALE - C-SSRS
6. HAVE YOU EVER DONE ANYTHING, STARTED TO DO ANYTHING, OR PREPARED TO DO ANYTHING TO END YOUR LIFE?: NO
1. IN THE PAST MONTH, HAVE YOU WISHED YOU WERE DEAD OR WISHED YOU COULD GO TO SLEEP AND NOT WAKE UP?: NO
2. HAVE YOU ACTUALLY HAD ANY THOUGHTS OF KILLING YOURSELF IN THE PAST MONTH?: NO

## 2025-05-24 ASSESSMENT — ACTIVITIES OF DAILY LIVING (ADL): ADLS_ACUITY_SCORE: 41

## 2025-05-24 NOTE — ED TRIAGE NOTES
Pt has been having painful urinating with increased frequency. Pt is experiencing burning as well. Pt just finished a Doxycycline antibiotic two days ago.

## 2025-05-24 NOTE — ED PROVIDER NOTES
[facility] [place] ([facility])    5/24/25            History     Chief Complaint   Patient presents with    Urinary Frequency     HPI  Kinza Purdy is a 29 year old adult who with PMH of ADHD, autism spectrum disorder, depression and allergy presents to the ED due to urinary frequency.  Patient notes that he has had ongoing issues with urinary frequency and is concerned that this might reflect an underlying urinary tract infection.    Past Medical History  Past Medical History:   Diagnosis Date    Allergy      No past surgical history on file.  atomoxetine (STRATTERA) 80 MG capsule  cephALEXin (KEFLEX) 500 MG capsule  FLUoxetine (PROZAC) 40 MG capsule  hydrOXYzine (ATARAX) 25 MG tablet  loratadine (CLARITIN) 10 MG tablet  nitroFURantoin macrocrystal-monohydrate (MACROBID) 100 MG capsule  testosterone (ANDROGEL 1.62 % PUMP) 20.25 MG/ACT gel      Allergies   Allergen Reactions    Cat Dander Itching    Droperidol Unknown     Pt could not explain her s/sx however reported that she was feeling like she might die     Family History  Family History   Problem Relation Age of Onset    Depression Mother     Hypertension Father     Diabetes Maternal Grandmother      Social History   Social History     Tobacco Use    Smoking status: Never    Smokeless tobacco: Never   Vaping Use    Vaping status: Never Used   Substance Use Topics    Alcohol use: Yes     Comment: rare    Drug use: Never      Past medical history, past surgical history, medications, allergies, family history, and social history were reviewed with the patient. No additional pertinent items.   A complete review of systems was performed with pertinent positives and negatives noted in the HPI, and all other systems negative.    Physical Exam   BP: 126/72  Pulse: 69  Temp: 98.2  F (36.8  C)  Resp: 18  SpO2: 98 %  Physical Exam  Constitutional:       General: He is not in acute distress.     Appearance: Normal appearance. He is not  diaphoretic.   HENT:      Head: Atraumatic.      Mouth/Throat:      Mouth: Mucous membranes are moist.   Eyes:      General: No scleral icterus.     Conjunctiva/sclera: Conjunctivae normal.   Cardiovascular:      Rate and Rhythm: Normal rate.      Heart sounds: Normal heart sounds.   Pulmonary:      Effort: No respiratory distress.      Breath sounds: Normal breath sounds.   Abdominal:      General: Abdomen is flat.      Tenderness: There is no right CVA tenderness or left CVA tenderness.   Musculoskeletal:      Cervical back: Neck supple.   Skin:     General: Skin is warm.      Findings: No rash.   Neurological:      General: No focal deficit present.      Mental Status: He is alert and oriented to person, place, and time.      Sensory: No sensory deficit.      Motor: No weakness.      Coordination: Coordination normal.           ED Course, Procedures, & Data      Procedures         Results for orders placed or performed during the hospital encounter of 05/24/25   UA with Microscopic reflex to Culture     Status: Abnormal    Specimen: Urine, Clean Catch   Result Value Ref Range    Color Urine Yellow Colorless, Straw, Light Yellow, Yellow    Appearance Urine Clear Clear    Glucose Urine Negative Negative mg/dL    Bilirubin Urine Negative Negative    Ketones Urine Negative Negative mg/dL    Specific Gravity Urine 1.023 1.003 - 1.035    Blood Urine Negative Negative    pH Urine 5.5 5.0 - 7.0    Protein Albumin Urine 50 (A) Negative mg/dL    Urobilinogen Urine Normal Normal mg/dL    Nitrite Urine Negative Negative    Leukocyte Esterase Urine Moderate (A) Negative    Bacteria Urine Few (A) None Seen /HPF    Mucus Urine Present (A) None Seen /LPF    Amorphous Crystals Urine Few (A) None Seen /HPF    RBC Urine 1 <=2 /HPF    WBC Urine 12 (H) <=5 /HPF    Squamous Epithelials Urine 5 (H) <=1 /HPF    Transitional Epithelials Urine <1 <=1 /HPF    Hyaline Casts Urine 1 <=2 /LPF    Narrative    Urine Culture ordered based on  laboratory criteria   Urine Culture     Status: None (Preliminary result)    Specimen: Urine, Clean Catch   Result Value Ref Range    Culture Culture in progress      Medications - No data to display  Labs Ordered and Resulted from Time of ED Arrival to Time of ED Departure   ROUTINE UA WITH MICROSCOPIC REFLEX TO CULTURE - Abnormal       Result Value    Color Urine Yellow      Appearance Urine Clear      Glucose Urine Negative      Bilirubin Urine Negative      Ketones Urine Negative      Specific Gravity Urine 1.023      Blood Urine Negative      pH Urine 5.5      Protein Albumin Urine 50 (*)     Urobilinogen Urine Normal      Nitrite Urine Negative      Leukocyte Esterase Urine Moderate (*)     Bacteria Urine Few (*)     Mucus Urine Present (*)     Amorphous Crystals Urine Few (*)     RBC Urine 1      WBC Urine 12 (*)     Squamous Epithelials Urine 5 (*)     Transitional Epithelials Urine <1      Hyaline Casts Urine 1       No orders to display          Critical care was not performed.     Medical Decision Making  The patient's presentation was of moderate complexity (an acute illness with systemic symptoms).    The patient's evaluation involved:  ordering and/or review of 3+ test(s) in this encounter (see separate area of note for details)    The patient's management necessitated moderate risk (prescription drug management including medications given in the ED).    Assessment & Plan        I have reviewed the nursing notes. I have reviewed the findings, diagnosis, plan and need for follow up with the patient.    Discharge Medication List as of 5/24/2025  6:34 PM        START taking these medications    Details   nitroFURantoin macrocrystal-monohydrate (MACROBID) 100 MG capsule Take 1 capsule (100 mg) by mouth 2 times daily., Disp-14 capsule, R-0, [employer]         Patient with urinary tract infection at this time started on Macrobid with ongoing urinary tract symptoms with urinalysis suspicious for UTI patient be  started on Macrobid has been informed to follow-up very closely if continued recurrent UTIs would consider urology consult.    Final diagnoses:   Urinary tract infection without hematuria, site unspecified       Sav ORDAZ [facility] EMERGENCY DEPARTMENT  5/24/2025     Sav Recio MD  05/25/25 0724

## 2025-05-24 NOTE — DISCHARGE INSTRUCTIONS
Discharge to home with Macrobid prescription following up with your primary MD for culture recheck in 2 days

## 2025-05-27 ENCOUNTER — TELEPHONE (OUTPATIENT)
Dept: NURSING | Facility: CLINIC | Age: 30
End: 2025-05-27
Payer: COMMERCIAL

## 2025-05-27 LAB
BACTERIA UR CULT: ABNORMAL
BACTERIA UR CULT: ABNORMAL

## 2025-05-27 NOTE — TELEPHONE ENCOUNTER
[facility] ([facility])    Reason for call: Lab Result Notification     Lab Result (including Rx patient on, if applicable).  If culture, copy of lab report at bottom.  Lab Result: Urine Culture - See Below    ED Rx: nitroFURantoin macrocrystal-monohydrate (MACROBID) 100 MG capsule - Take 1 capsule (100 mg) by mouth 2 times daily   10,000-50,000 CFU/mL Staphylococcus epidermidis Abnormal  (SUSCEPTIBLE)  10,000-50,000 CFU/mL Streptococcus anginosus Abnormal  (NOT TESTED)    Creatinine Level (mg/dl)   Creatinine   Date Value Ref Range Status   04/29/2021 0.51 (L) 0.52 - 1.04 mg/dL Final    Creatinine clearance (ml/min), if applicable    Creatinine clearance cannot be calculated (Patient's most recent lab result is older than the maximum 365 days allowed.)     ED Symptoms: Presented to the ED with urinary frequency.    Current Symptoms: Unable to assess.     RN Recommendations/Instructions per [place] ED lab result protocol:   [facility] ED lab result protocol utilized: Urine Culture  Will need a ED provider consult.     Unable to reach patient/caregiver.     Left voicemail message requesting a call back to [phone] between 9 a.m. and 5:30 p.m. for patient's ED/ lab results.    Letter pended to be sent via [employer].         LAUREN KEARNS RN

## 2025-05-27 NOTE — LETTER
May 27, 2025        Kinza Purdy  [address]  [place] [address]          Dear Kinza Purdy:    You were seen in the [facility] at [facility]) on 5/24/2025.  We are unable to reach you by phone, so we are sending you this letter.     It is important that you call [facility] lab result nurse at [phone], as we have information to relay to you AND/OR we MAY have to make some changes in your treatment.    Best time to call back is between 9AM and 5:30PM, 7 days a week.      Sincerely,     [facility] Lab Result RN  [phone]

## 2025-05-28 NOTE — ED PROVIDER NOTES
Contacted regarding patient's positive urine culture.  10-50,000 colonies of staph epi and strep anginosus, both could potentially be skin contaminant on the perineum, and less than 100,000 colonies.  The patient is on Macrobid and is feeling okay not completely asymptomatic.  I would recommend she continue her current treatment with Macrobid and follow-up at the completion of her antibiotics for reassessment.  This was discussed with the RN that contacted this provider about the culture     Ronnie Griffin MD  05/28/25 4411

## 2025-05-28 NOTE — TELEPHONE ENCOUNTER
Patient's current Symptoms:   Christine reports still having some pelvic pain, not worse, tolerating fluids and afebrile.    RN Recommendations/Instructions per [place] ED lab result protocol:   RN will consult with ED provider.    [facility] Emergency Department Provider: Dr Griffin  Consultation Time: 1240 pm  Provider Recommendation:  Continue taking Macrobid and follow up with PCP to repeat UA/UC  Patient/parent notified of Providers recommendations (YES/NO): yes  .    Patient/care giver notified to contact your PCP clinic or return to the Emergency department if your:  Symptoms return.  Symptoms do not improve after 3 days on antibiotic.  Symptoms do not resolve after completing antibiotic.  Symptoms worsen or other concerning symptoms.    Ivon Reyes, RN